# Patient Record
Sex: FEMALE | Race: WHITE | NOT HISPANIC OR LATINO | URBAN - METROPOLITAN AREA
[De-identification: names, ages, dates, MRNs, and addresses within clinical notes are randomized per-mention and may not be internally consistent; named-entity substitution may affect disease eponyms.]

---

## 2018-11-29 ENCOUNTER — OUTPATIENT (OUTPATIENT)
Dept: OUTPATIENT SERVICES | Facility: HOSPITAL | Age: 83
LOS: 1 days | Discharge: HOME | End: 2018-11-29

## 2018-11-29 DIAGNOSIS — E03.9 HYPOTHYROIDISM, UNSPECIFIED: ICD-10-CM

## 2018-11-29 DIAGNOSIS — E11.9 TYPE 2 DIABETES MELLITUS WITHOUT COMPLICATIONS: ICD-10-CM

## 2018-11-29 DIAGNOSIS — I10 ESSENTIAL (PRIMARY) HYPERTENSION: ICD-10-CM

## 2018-11-29 DIAGNOSIS — Z00.00 ENCOUNTER FOR GENERAL ADULT MEDICAL EXAMINATION WITHOUT ABNORMAL FINDINGS: ICD-10-CM

## 2018-11-29 DIAGNOSIS — E78.2 MIXED HYPERLIPIDEMIA: ICD-10-CM

## 2021-01-25 ENCOUNTER — EMERGENCY (EMERGENCY)
Facility: HOSPITAL | Age: 86
LOS: 0 days | Discharge: HOME | End: 2021-01-25
Attending: EMERGENCY MEDICINE | Admitting: EMERGENCY MEDICINE
Payer: MEDICARE

## 2021-01-25 VITALS
SYSTOLIC BLOOD PRESSURE: 179 MMHG | HEART RATE: 90 BPM | DIASTOLIC BLOOD PRESSURE: 83 MMHG | TEMPERATURE: 99 F | RESPIRATION RATE: 18 BRPM | OXYGEN SATURATION: 97 %

## 2021-01-25 VITALS
DIASTOLIC BLOOD PRESSURE: 93 MMHG | WEIGHT: 115.08 LBS | TEMPERATURE: 99 F | RESPIRATION RATE: 18 BRPM | SYSTOLIC BLOOD PRESSURE: 185 MMHG | OXYGEN SATURATION: 98 % | HEART RATE: 73 BPM

## 2021-01-25 DIAGNOSIS — R11.0 NAUSEA: ICD-10-CM

## 2021-01-25 DIAGNOSIS — R10.13 EPIGASTRIC PAIN: ICD-10-CM

## 2021-01-25 DIAGNOSIS — Z20.822 CONTACT WITH AND (SUSPECTED) EXPOSURE TO COVID-19: ICD-10-CM

## 2021-01-25 DIAGNOSIS — R11.2 NAUSEA WITH VOMITING, UNSPECIFIED: ICD-10-CM

## 2021-01-25 DIAGNOSIS — I10 ESSENTIAL (PRIMARY) HYPERTENSION: ICD-10-CM

## 2021-01-25 DIAGNOSIS — R42 DIZZINESS AND GIDDINESS: ICD-10-CM

## 2021-01-25 LAB
ALBUMIN SERPL ELPH-MCNC: 4.9 G/DL — SIGNIFICANT CHANGE UP (ref 3.5–5.2)
ALP SERPL-CCNC: 90 U/L — SIGNIFICANT CHANGE UP (ref 30–115)
ALT FLD-CCNC: 12 U/L — SIGNIFICANT CHANGE UP (ref 0–41)
ANION GAP SERPL CALC-SCNC: 15 MMOL/L — HIGH (ref 7–14)
APPEARANCE UR: CLEAR — SIGNIFICANT CHANGE UP
AST SERPL-CCNC: 20 U/L — SIGNIFICANT CHANGE UP (ref 0–41)
BACTERIA # UR AUTO: NEGATIVE — SIGNIFICANT CHANGE UP
BASOPHILS # BLD AUTO: 0.04 K/UL — SIGNIFICANT CHANGE UP (ref 0–0.2)
BASOPHILS NFR BLD AUTO: 0.4 % — SIGNIFICANT CHANGE UP (ref 0–1)
BILIRUB DIRECT SERPL-MCNC: 0.2 MG/DL — SIGNIFICANT CHANGE UP (ref 0–0.2)
BILIRUB INDIRECT FLD-MCNC: 0.6 MG/DL — SIGNIFICANT CHANGE UP (ref 0.2–1.2)
BILIRUB SERPL-MCNC: 0.8 MG/DL — SIGNIFICANT CHANGE UP (ref 0.2–1.2)
BILIRUB UR-MCNC: NEGATIVE — SIGNIFICANT CHANGE UP
BUN SERPL-MCNC: 19 MG/DL — SIGNIFICANT CHANGE UP (ref 10–20)
CALCIUM SERPL-MCNC: 10 MG/DL — SIGNIFICANT CHANGE UP (ref 8.5–10.1)
CHLORIDE SERPL-SCNC: 104 MMOL/L — SIGNIFICANT CHANGE UP (ref 98–110)
CO2 SERPL-SCNC: 22 MMOL/L — SIGNIFICANT CHANGE UP (ref 17–32)
COLOR SPEC: SIGNIFICANT CHANGE UP
CREAT SERPL-MCNC: 1.1 MG/DL — SIGNIFICANT CHANGE UP (ref 0.7–1.5)
DIFF PNL FLD: ABNORMAL
EOSINOPHIL # BLD AUTO: 0.05 K/UL — SIGNIFICANT CHANGE UP (ref 0–0.7)
EOSINOPHIL NFR BLD AUTO: 0.5 % — SIGNIFICANT CHANGE UP (ref 0–8)
EPI CELLS # UR: 1 /HPF — SIGNIFICANT CHANGE UP (ref 0–5)
GLUCOSE SERPL-MCNC: 143 MG/DL — HIGH (ref 70–99)
GLUCOSE UR QL: NEGATIVE — SIGNIFICANT CHANGE UP
HCT VFR BLD CALC: 41.3 % — SIGNIFICANT CHANGE UP (ref 37–47)
HGB BLD-MCNC: 13.3 G/DL — SIGNIFICANT CHANGE UP (ref 12–16)
HYALINE CASTS # UR AUTO: 1 /LPF — SIGNIFICANT CHANGE UP (ref 0–7)
IMM GRANULOCYTES NFR BLD AUTO: 0.5 % — HIGH (ref 0.1–0.3)
KETONES UR-MCNC: ABNORMAL
LACTATE SERPL-SCNC: 1.7 MMOL/L — SIGNIFICANT CHANGE UP (ref 0.7–2)
LEUKOCYTE ESTERASE UR-ACNC: NEGATIVE — SIGNIFICANT CHANGE UP
LIDOCAIN IGE QN: 33 U/L — SIGNIFICANT CHANGE UP (ref 7–60)
LYMPHOCYTES # BLD AUTO: 2.19 K/UL — SIGNIFICANT CHANGE UP (ref 1.2–3.4)
LYMPHOCYTES # BLD AUTO: 21.7 % — SIGNIFICANT CHANGE UP (ref 20.5–51.1)
MCHC RBC-ENTMCNC: 29.4 PG — SIGNIFICANT CHANGE UP (ref 27–31)
MCHC RBC-ENTMCNC: 32.2 G/DL — SIGNIFICANT CHANGE UP (ref 32–37)
MCV RBC AUTO: 91.4 FL — SIGNIFICANT CHANGE UP (ref 81–99)
MONOCYTES # BLD AUTO: 0.8 K/UL — HIGH (ref 0.1–0.6)
MONOCYTES NFR BLD AUTO: 7.9 % — SIGNIFICANT CHANGE UP (ref 1.7–9.3)
NEUTROPHILS # BLD AUTO: 6.95 K/UL — HIGH (ref 1.4–6.5)
NEUTROPHILS NFR BLD AUTO: 69 % — SIGNIFICANT CHANGE UP (ref 42.2–75.2)
NITRITE UR-MCNC: NEGATIVE — SIGNIFICANT CHANGE UP
NRBC # BLD: 0 /100 WBCS — SIGNIFICANT CHANGE UP (ref 0–0)
PH UR: 6.5 — SIGNIFICANT CHANGE UP (ref 5–8)
PLATELET # BLD AUTO: 236 K/UL — SIGNIFICANT CHANGE UP (ref 130–400)
POTASSIUM SERPL-MCNC: 3.8 MMOL/L — SIGNIFICANT CHANGE UP (ref 3.5–5)
POTASSIUM SERPL-SCNC: 3.8 MMOL/L — SIGNIFICANT CHANGE UP (ref 3.5–5)
PROT SERPL-MCNC: 7.5 G/DL — SIGNIFICANT CHANGE UP (ref 6–8)
PROT UR-MCNC: ABNORMAL
RBC # BLD: 4.52 M/UL — SIGNIFICANT CHANGE UP (ref 4.2–5.4)
RBC # FLD: 13.8 % — SIGNIFICANT CHANGE UP (ref 11.5–14.5)
RBC CASTS # UR COMP ASSIST: 3 /HPF — SIGNIFICANT CHANGE UP (ref 0–4)
SARS-COV-2 RNA SPEC QL NAA+PROBE: SIGNIFICANT CHANGE UP
SODIUM SERPL-SCNC: 141 MMOL/L — SIGNIFICANT CHANGE UP (ref 135–146)
SP GR SPEC: 1.05 — HIGH (ref 1.01–1.03)
TROPONIN T SERPL-MCNC: <0.01 NG/ML — SIGNIFICANT CHANGE UP
UROBILINOGEN FLD QL: SIGNIFICANT CHANGE UP
WBC # BLD: 10.08 K/UL — SIGNIFICANT CHANGE UP (ref 4.8–10.8)
WBC # FLD AUTO: 10.08 K/UL — SIGNIFICANT CHANGE UP (ref 4.8–10.8)
WBC UR QL: 12 /HPF — HIGH (ref 0–5)

## 2021-01-25 PROCEDURE — 70450 CT HEAD/BRAIN W/O DYE: CPT | Mod: 26

## 2021-01-25 PROCEDURE — 93010 ELECTROCARDIOGRAM REPORT: CPT

## 2021-01-25 PROCEDURE — 74177 CT ABD & PELVIS W/CONTRAST: CPT | Mod: 26

## 2021-01-25 PROCEDURE — 99284 EMERGENCY DEPT VISIT MOD MDM: CPT

## 2021-01-25 RX ORDER — MECLIZINE HCL 12.5 MG
50 TABLET ORAL ONCE
Refills: 0 | Status: COMPLETED | OUTPATIENT
Start: 2021-01-25 | End: 2021-01-25

## 2021-01-25 RX ORDER — SODIUM CHLORIDE 9 MG/ML
1000 INJECTION, SOLUTION INTRAVENOUS ONCE
Refills: 0 | Status: COMPLETED | OUTPATIENT
Start: 2021-01-25 | End: 2021-01-25

## 2021-01-25 RX ORDER — FAMOTIDINE 10 MG/ML
20 INJECTION INTRAVENOUS ONCE
Refills: 0 | Status: COMPLETED | OUTPATIENT
Start: 2021-01-25 | End: 2021-01-25

## 2021-01-25 RX ORDER — ONDANSETRON 8 MG/1
4 TABLET, FILM COATED ORAL ONCE
Refills: 0 | Status: COMPLETED | OUTPATIENT
Start: 2021-01-25 | End: 2021-01-25

## 2021-01-25 RX ORDER — MECLIZINE HCL 12.5 MG
1 TABLET ORAL
Qty: 9 | Refills: 0
Start: 2021-01-25 | End: 2021-01-27

## 2021-01-25 RX ADMIN — Medication 50 MILLIGRAM(S): at 14:52

## 2021-01-25 RX ADMIN — FAMOTIDINE 20 MILLIGRAM(S): 10 INJECTION INTRAVENOUS at 14:52

## 2021-01-25 RX ADMIN — SODIUM CHLORIDE 1000 MILLILITER(S): 9 INJECTION, SOLUTION INTRAVENOUS at 14:52

## 2021-01-25 RX ADMIN — ONDANSETRON 4 MILLIGRAM(S): 8 TABLET, FILM COATED ORAL at 14:53

## 2021-01-25 NOTE — ED PROVIDER NOTE - NS ED ROS FT
Constitutional: (-) fever, (-) chills  Eyes: (+) visual changes  ENT: (-) nasal congestions  Cardiovascular: (-) chest pain, (-) syncope  Respiratory: (-) cough, (-) shortness of breath, (-) dyspnea,   Gastrointestinal: (+) vomiting, (-) diarrhea, (+)nausea,  Musculoskeletal: (-) neck pain, (-) back pain, (-) joint pain,  Integumentary: (-) rash, (-) edema, (-) bruises  Neurological: (-) headache, (-) loc, (+) dizziness, (-) tingling, (-)numbness,  Peripheral Vascular: (-) leg swelling  :  (-)dysuria,  (-) hematuria  Allergic/Immunologic: (-) pruritus

## 2021-01-25 NOTE — ED ADULT NURSE REASSESSMENT NOTE - NS ED NURSE REASSESS COMMENT FT1
Pt assessed. IV patent. Ambulated with assistant. Pt resting comfortably in bed. Awaiting for urine result.

## 2021-01-25 NOTE — ED PROVIDER NOTE - PROGRESS NOTE DETAILS
pt ambulating without difficulties, reports dizziness, nausea, and vision issues resolved. s/w daughter, will  pt. sxs that should prompt return explained to pt, verbalizes understanding.

## 2021-01-25 NOTE — ED ADULT NURSE NOTE - NSIMPLEMENTINTERV_GEN_ALL_ED
Implemented All Fall with Harm Risk Interventions:  [place] to call system. Call bell, personal items and telephone within reach. Instruct patient to call for assistance. Room bathroom lighting operational. Non-slip footwear when patient is off stretcher. Physically safe environment: no spills, clutter or unnecessary equipment. Stretcher in lowest position, wheels locked, appropriate side rails in place. Provide visual cue, wrist band, yellow gown, etc. Monitor gait and stability. Monitor for mental status changes and reorient to person, place, and time. Review medications for side effects contributing to fall risk. Reinforce activity limits and safety measures with patient and family. Provide visual clues: red socks.

## 2021-01-25 NOTE — ED ADULT NURSE NOTE - CAS ELECT INFOMATION PROVIDED
DC instructions d/c instruction discussed with daughter Khalida and verbalized understanding/DC instructions

## 2021-01-25 NOTE — ED ADULT NURSE NOTE - OBJECTIVE STATEMENT
Pt BIBA from PMD for  nausea, vomiting, dizziness and blurry vision x 2 weeks.. Denies any other symptoms.

## 2021-01-25 NOTE — ED PROVIDER NOTE - ATTENDING CONTRIBUTION TO CARE
87F PMH HTN hypothyroid, p/w 1 week of lightheadedness, tired, epigastric burning, nbnb emesis. no cp, sob. no fever, cough. no d/c. occasional dysuria and freq. no hematuria. no ha, numbness, weakness, tingling. +blurry vision. no loc or trauma. pt seen at  and sent to ED, given dose of zofran. lives w son.  hard of hearing.     on exam, AFVSS, well hasmukh nad, ncat, eomi, perrla, mmm, lctab, rrr nl s1s2 no mrg, abd soft +mild suprapubic ttp, no rebound or rigidity, no cvat, nd, aaox3, CN 2-12 intact, No nystagmus.  5/5 motor x 4 ext, SILT x 4 extremities, No facial droop or slurred speech. No pronator drift.  Normal rapid alternating movement bilaterally. No midline C/T/L tenderness to palpation or step off. no le edema or calf ttp,     a/p; dizzy, nausea, abd pain, will do labs, ekg/trop, CXR, UA, CTH, CT a/p, ivf zofran meclizine re-eval

## 2021-01-25 NOTE — ED ADULT TRIAGE NOTE - CHIEF COMPLAINT QUOTE
BIBA from doctor's office, sent in for hypertension, did not take blood pressure medications, was originally being seen for nausea, denies abdominal pain, given zofran IM by MD.

## 2021-01-25 NOTE — ED PROVIDER NOTE - CARE PROVIDER_API CALL
Tate Ewing)  EEGEpilepsy; Neurology  [address] [place] [address], [address]  [place] [address]  Phone: [phone]  Fax: [phone]  Follow Up Time: 1-3 Days

## 2021-01-25 NOTE — ED PROVIDER NOTE - PATIENT PORTAL LINK FT
You can access the FollowMyHealth Patient Portal offered by [facility] by registering at the following website: [email or web address]. By joining [employer]’s FollowMyHealth portal, you will also be able to view your health information using other applications (apps) compatible with our system.

## 2021-01-25 NOTE — ED PROVIDER NOTE - PHYSICAL EXAMINATION
Physical Exam    Vital Signs: I have reviewed the initial vital signs.  Constitutional: well-nourished, appears stated age, no acute distress  Eyes: Conjunctiva pink, Sclera clear, PERRLA, EOMI, no nystagmus  Cardiovascular: S1 and S2, regular rate, regular rhythm, well-perfused extremities, radial pulses equal and 2+  Respiratory: unlabored respiratory effort, clear to auscultation bilaterally no wheezing, rales and rhonchi  Gastrointestinal: soft, mild lower abd ttp, no guarding or rebound, no pulsatile mass, normal bowl sounds  Musculoskeletal: supple neck, no lower extremity edema, no midline tenderness  Integumentary: warm, dry, no rash  Neurologic: awake, alert, cranial nerves II-XII grossly intact, extremities’ motor and sensory functions grossly intact, no pronator drift, normal speech, no focal deficits.

## 2021-01-25 NOTE — ED PROVIDER NOTE - OBJECTIVE STATEMENT
86 yo female, pmh of htn and hypothyroidism, presents to ed for nausea, dizziness, blurry vision x 1.5 weeks, [place] [facility] [place] sent in for further evaluation. Pt states mild lower abd pain as fell, mild, aching, no radiation. States has vertigo before in past and similar to this. Denies fever, chills, cp, sob, le swelling, diarrhea, numbness, tingling.

## 2021-01-25 NOTE — ED PROVIDER NOTE - CLINICAL SUMMARY MEDICAL DECISION MAKING FREE TEXT BOX
pt symptoms resolved, ambulating in ED w steady gait. andres po. work up unremarkable. discussed w daughter who will come  pt. dc home, supportive care, f/u pmd 1-2 weeks, strict return precautions provided.

## 2021-04-04 ENCOUNTER — EMERGENCY (EMERGENCY)
Facility: HOSPITAL | Age: 86
LOS: 0 days | Discharge: HOME | End: 2021-04-04
Attending: EMERGENCY MEDICINE | Admitting: EMERGENCY MEDICINE
Payer: MEDICARE

## 2021-04-04 VITALS
OXYGEN SATURATION: 99 % | SYSTOLIC BLOOD PRESSURE: 175 MMHG | RESPIRATION RATE: 18 BRPM | HEART RATE: 105 BPM | DIASTOLIC BLOOD PRESSURE: 81 MMHG | TEMPERATURE: 96 F

## 2021-04-04 DIAGNOSIS — R10.13 EPIGASTRIC PAIN: ICD-10-CM

## 2021-04-04 DIAGNOSIS — R42 DIZZINESS AND GIDDINESS: ICD-10-CM

## 2021-04-04 PROBLEM — I10 ESSENTIAL (PRIMARY) HYPERTENSION: Chronic | Status: ACTIVE | Noted: 2021-01-25

## 2021-04-04 PROBLEM — E03.9 HYPOTHYROIDISM, UNSPECIFIED: Chronic | Status: ACTIVE | Noted: 2021-01-25

## 2021-04-04 LAB
ALBUMIN SERPL ELPH-MCNC: 4.9 G/DL — SIGNIFICANT CHANGE UP (ref 3.5–5.2)
ALP SERPL-CCNC: 84 U/L — SIGNIFICANT CHANGE UP (ref 30–115)
ALT FLD-CCNC: 11 U/L — SIGNIFICANT CHANGE UP (ref 0–41)
ANION GAP SERPL CALC-SCNC: 16 MMOL/L — HIGH (ref 7–14)
APTT BLD: 29.5 SEC — SIGNIFICANT CHANGE UP (ref 27–39.2)
AST SERPL-CCNC: 20 U/L — SIGNIFICANT CHANGE UP (ref 0–41)
BASOPHILS # BLD AUTO: 0.07 K/UL — SIGNIFICANT CHANGE UP (ref 0–0.2)
BASOPHILS NFR BLD AUTO: 0.6 % — SIGNIFICANT CHANGE UP (ref 0–1)
BILIRUB SERPL-MCNC: 0.3 MG/DL — SIGNIFICANT CHANGE UP (ref 0.2–1.2)
BUN SERPL-MCNC: 17 MG/DL — SIGNIFICANT CHANGE UP (ref 10–20)
CALCIUM SERPL-MCNC: 10.3 MG/DL — HIGH (ref 8.5–10.1)
CHLORIDE SERPL-SCNC: 105 MMOL/L — SIGNIFICANT CHANGE UP (ref 98–110)
CO2 SERPL-SCNC: 21 MMOL/L — SIGNIFICANT CHANGE UP (ref 17–32)
CREAT SERPL-MCNC: 1.2 MG/DL — SIGNIFICANT CHANGE UP (ref 0.7–1.5)
EOSINOPHIL # BLD AUTO: 0.18 K/UL — SIGNIFICANT CHANGE UP (ref 0–0.7)
EOSINOPHIL NFR BLD AUTO: 1.5 % — SIGNIFICANT CHANGE UP (ref 0–8)
GLUCOSE SERPL-MCNC: 208 MG/DL — HIGH (ref 70–99)
HCT VFR BLD CALC: 41.4 % — SIGNIFICANT CHANGE UP (ref 37–47)
HGB BLD-MCNC: 13.5 G/DL — SIGNIFICANT CHANGE UP (ref 12–16)
IMM GRANULOCYTES NFR BLD AUTO: 0.2 % — SIGNIFICANT CHANGE UP (ref 0.1–0.3)
INR BLD: 0.97 RATIO — SIGNIFICANT CHANGE UP (ref 0.65–1.3)
LIDOCAIN IGE QN: 70 U/L — HIGH (ref 7–60)
LYMPHOCYTES # BLD AUTO: 24.6 % — SIGNIFICANT CHANGE UP (ref 20.5–51.1)
LYMPHOCYTES # BLD AUTO: 3.02 K/UL — SIGNIFICANT CHANGE UP (ref 1.2–3.4)
MCHC RBC-ENTMCNC: 29.9 PG — SIGNIFICANT CHANGE UP (ref 27–31)
MCHC RBC-ENTMCNC: 32.6 G/DL — SIGNIFICANT CHANGE UP (ref 32–37)
MCV RBC AUTO: 91.8 FL — SIGNIFICANT CHANGE UP (ref 81–99)
MONOCYTES # BLD AUTO: 0.85 K/UL — HIGH (ref 0.1–0.6)
MONOCYTES NFR BLD AUTO: 6.9 % — SIGNIFICANT CHANGE UP (ref 1.7–9.3)
NEUTROPHILS # BLD AUTO: 8.12 K/UL — HIGH (ref 1.4–6.5)
NEUTROPHILS NFR BLD AUTO: 66.2 % — SIGNIFICANT CHANGE UP (ref 42.2–75.2)
NRBC # BLD: 0 /100 WBCS — SIGNIFICANT CHANGE UP (ref 0–0)
PLATELET # BLD AUTO: 213 K/UL — SIGNIFICANT CHANGE UP (ref 130–400)
POTASSIUM SERPL-MCNC: 4.4 MMOL/L — SIGNIFICANT CHANGE UP (ref 3.5–5)
POTASSIUM SERPL-SCNC: 4.4 MMOL/L — SIGNIFICANT CHANGE UP (ref 3.5–5)
PROT SERPL-MCNC: 7.8 G/DL — SIGNIFICANT CHANGE UP (ref 6–8)
PROTHROM AB SERPL-ACNC: 11.2 SEC — SIGNIFICANT CHANGE UP (ref 9.95–12.87)
RBC # BLD: 4.51 M/UL — SIGNIFICANT CHANGE UP (ref 4.2–5.4)
RBC # FLD: 13.7 % — SIGNIFICANT CHANGE UP (ref 11.5–14.5)
SODIUM SERPL-SCNC: 142 MMOL/L — SIGNIFICANT CHANGE UP (ref 135–146)
WBC # BLD: 12.27 K/UL — HIGH (ref 4.8–10.8)
WBC # FLD AUTO: 12.27 K/UL — HIGH (ref 4.8–10.8)

## 2021-04-04 PROCEDURE — 99284 EMERGENCY DEPT VISIT MOD MDM: CPT | Mod: GC

## 2021-04-04 PROCEDURE — 70450 CT HEAD/BRAIN W/O DYE: CPT | Mod: 26,MH

## 2021-04-04 PROCEDURE — 74177 CT ABD & PELVIS W/CONTRAST: CPT | Mod: 26,MH

## 2021-04-04 PROCEDURE — 93010 ELECTROCARDIOGRAM REPORT: CPT

## 2021-04-04 RX ORDER — FAMOTIDINE 10 MG/ML
20 INJECTION INTRAVENOUS ONCE
Refills: 0 | Status: COMPLETED | OUTPATIENT
Start: 2021-04-04 | End: 2021-04-04

## 2021-04-04 RX ORDER — MECLIZINE HCL 12.5 MG
50 TABLET ORAL ONCE
Refills: 0 | Status: COMPLETED | OUTPATIENT
Start: 2021-04-04 | End: 2021-04-04

## 2021-04-04 RX ORDER — METOCLOPRAMIDE HCL 10 MG
10 TABLET ORAL ONCE
Refills: 0 | Status: COMPLETED | OUTPATIENT
Start: 2021-04-04 | End: 2021-04-04

## 2021-04-04 RX ORDER — SODIUM CHLORIDE 9 MG/ML
1500 INJECTION, SOLUTION INTRAVENOUS ONCE
Refills: 0 | Status: COMPLETED | OUTPATIENT
Start: 2021-04-04 | End: 2021-04-04

## 2021-04-04 RX ORDER — MECLIZINE HCL 12.5 MG
25 TABLET ORAL ONCE
Refills: 0 | Status: DISCONTINUED | OUTPATIENT
Start: 2021-04-04 | End: 2021-04-04

## 2021-04-04 RX ADMIN — Medication 50 MILLIGRAM(S): at 02:02

## 2021-04-04 RX ADMIN — FAMOTIDINE 104 MILLIGRAM(S): 10 INJECTION INTRAVENOUS at 01:14

## 2021-04-04 RX ADMIN — Medication 10 MILLIGRAM(S): at 02:47

## 2021-04-04 RX ADMIN — SODIUM CHLORIDE 1500 MILLILITER(S): 9 INJECTION, SOLUTION INTRAVENOUS at 01:14

## 2021-04-04 NOTE — ED ADULT TRIAGE NOTE - CHIEF COMPLAINT QUOTE
Pt c/o feeling dizzy, nauseous, epigastric and abd pain X 2 days. Denies diarrhea, fever. Has history of vertigo.

## 2021-04-04 NOTE — ED PROVIDER NOTE - NS ED ROS FT
Constitutional: See HPI.  Eyes: No visual changes, eye pain or discharge. No Photophobia  ENMT: No hearing changes, pain, discharge or infections. No neck pain or stiffness. No limited ROM  Cardiac: No SOB or edema. No chest pain with exertion.  Respiratory: No cough or respiratory distress.  GI: see hpi   : No dysuria, frequency or burning. No Discharge  MS: No myalgia, muscle weakness, joint pain or back pain.  Neuro: see hpi   Skin: No skin rash.  Except as documented in the HPI, all other systems are negative.

## 2021-04-04 NOTE — ED PROVIDER NOTE - ATTENDING CONTRIBUTION TO CARE
I personally evaluated the patient. I reviewed the Resident’s or Physician Assistant’s note (as assigned above), and agree with the findings and plan except as documented in my note.  87 yo woman with two complaints.  Patient has vertigo.  She feels the room is spinning.  It is worse with movement of her head.  She feels very unsteady.  In addition, she has epigastric pain with recurrent NB/NB emesis.  Bowel movements ok.  No GI bleeding.  Workup in the ED was ok, and after treatment with meclizine and IVF, patient much improved.  CT scans all ok including head/abd/pelvis. On reassessment, patient felt like she was well enough to go home.  Will discharge with plan for outpatient follow up.

## 2021-04-04 NOTE — ED PROVIDER NOTE - NS ED MD DISPO DISCHARGE CCDA
C/o [id number], body aches. Advised tylenol, rest light diet. To ER for temp over 101.   Patient/Caregiver provided printed discharge information.

## 2021-04-04 NOTE — ED PROVIDER NOTE - CLINICAL SUMMARY MEDICAL DECISION MAKING FREE TEXT BOX
89 yo woman with vertigo and abd pain.  thorough workup in the ED was normal.  Patient improved with some medication and IVF in ED.  On reassessment, patient would like to go home.  She was discharged with plan for outpatient follow up or return for any new or worsening,.

## 2021-04-04 NOTE — ED PROVIDER NOTE - CARE PLAN
Principal Discharge DX:	Abdominal pain   Principal Discharge DX:	Abdominal pain  Secondary Diagnosis:	Vertigo

## 2021-04-04 NOTE — ED PROVIDER NOTE - PATIENT PORTAL LINK FT
You can access the FollowMyHealth Patient Portal offered by [facility] by registering at the following website: [email or web address]. By joining [employer]’s FollowMyHealth portal, you will also be able to view your health information using other applications (apps) compatible with our system.

## 2021-04-04 NOTE — ED PROVIDER NOTE - OBJECTIVE STATEMENT
88F HTN, Vertigo and hypothyroidism, presents to ed for nausea, dizziness Pt also endores epigastric  abd pain mild, aching, no radiation associated with nbnb emesis. States has vertigo before in past and similar to this. Denies fever, chills, cp, sob, le swelling, diarrhea, numbness, tingling.

## 2021-04-04 NOTE — ED PROVIDER NOTE - PHYSICAL EXAMINATION
VITAL SIGNS: I have reviewed nursing notes and confirm.  CONSTITUTIONAL: well-appearing, non-toxic  SKIN: Warm dry, normal skin turgor  HEAD: NCAT  EYES: EOMI, PERRLA, no scleral icterus  ENT: Moist mucous membranes, normal pharynx with no erythema or exudates  NECK: Supple; non tender. Full ROM. No cervical LAD  CARD: RRR, no murmurs, rubs or gallops  RESP: clear to ausculation b/l.  No rales, rhonchi, or wheezing.  ABD: soft, + BS, epigastric tenderness, non-distended, no rebound or guarding. No CVA tenderness  EXT: Full ROM, no bony tenderness, no pedal edema, no calf tenderness  NEURO: normal motor. normal sensory. CN II-XII intact. Cerebellar testing normal. Normal gait.  PSYCH: Cooperative, appropriate.

## 2021-04-04 NOTE — ED PROVIDER NOTE - PROGRESS NOTE DETAILS
patients symptoms alleviated completely. denies any other complaints. will f/u with PCP. return precautions discussed.

## 2021-04-09 ENCOUNTER — INPATIENT (INPATIENT)
Facility: HOSPITAL | Age: 86
LOS: 4 days | Discharge: NURSING, REHAB OR LONG-TERM CARE | End: 2021-04-14
Attending: INTERNAL MEDICINE | Admitting: INTERNAL MEDICINE
Payer: MEDICARE

## 2021-04-09 VITALS
SYSTOLIC BLOOD PRESSURE: 164 MMHG | DIASTOLIC BLOOD PRESSURE: 74 MMHG | HEART RATE: 87 BPM | TEMPERATURE: 98 F | RESPIRATION RATE: 16 BRPM | OXYGEN SATURATION: 99 %

## 2021-04-09 LAB
ALBUMIN SERPL ELPH-MCNC: 4.2 G/DL — SIGNIFICANT CHANGE UP (ref 3.5–5.2)
ALP SERPL-CCNC: 69 U/L — SIGNIFICANT CHANGE UP (ref 30–115)
ALT FLD-CCNC: 10 U/L — SIGNIFICANT CHANGE UP (ref 0–41)
ANION GAP SERPL CALC-SCNC: 11 MMOL/L — SIGNIFICANT CHANGE UP (ref 7–14)
APPEARANCE UR: CLEAR — SIGNIFICANT CHANGE UP
AST SERPL-CCNC: 23 U/L — SIGNIFICANT CHANGE UP (ref 0–41)
BACTERIA # UR AUTO: ABNORMAL
BASOPHILS # BLD AUTO: 0.03 K/UL — SIGNIFICANT CHANGE UP (ref 0–0.2)
BASOPHILS NFR BLD AUTO: 0.4 % — SIGNIFICANT CHANGE UP (ref 0–1)
BILIRUB SERPL-MCNC: 0.6 MG/DL — SIGNIFICANT CHANGE UP (ref 0.2–1.2)
BILIRUB UR-MCNC: NEGATIVE — SIGNIFICANT CHANGE UP
BUN SERPL-MCNC: 13 MG/DL — SIGNIFICANT CHANGE UP (ref 10–20)
CALCIUM SERPL-MCNC: 9.3 MG/DL — SIGNIFICANT CHANGE UP (ref 8.5–10.1)
CHLORIDE SERPL-SCNC: 105 MMOL/L — SIGNIFICANT CHANGE UP (ref 98–110)
CO2 SERPL-SCNC: 22 MMOL/L — SIGNIFICANT CHANGE UP (ref 17–32)
COLOR SPEC: SIGNIFICANT CHANGE UP
CREAT SERPL-MCNC: 1 MG/DL — SIGNIFICANT CHANGE UP (ref 0.7–1.5)
DIFF PNL FLD: NEGATIVE — SIGNIFICANT CHANGE UP
EOSINOPHIL # BLD AUTO: 0.08 K/UL — SIGNIFICANT CHANGE UP (ref 0–0.7)
EOSINOPHIL NFR BLD AUTO: 1.2 % — SIGNIFICANT CHANGE UP (ref 0–8)
EPI CELLS # UR: 3 /HPF — SIGNIFICANT CHANGE UP (ref 0–5)
GLUCOSE SERPL-MCNC: 123 MG/DL — HIGH (ref 70–99)
GLUCOSE UR QL: NEGATIVE — SIGNIFICANT CHANGE UP
HCT VFR BLD CALC: 36.1 % — LOW (ref 37–47)
HGB BLD-MCNC: 11.7 G/DL — LOW (ref 12–16)
HYALINE CASTS # UR AUTO: 1 /LPF — SIGNIFICANT CHANGE UP (ref 0–7)
IMM GRANULOCYTES NFR BLD AUTO: 0.1 % — SIGNIFICANT CHANGE UP (ref 0.1–0.3)
KETONES UR-MCNC: NEGATIVE — SIGNIFICANT CHANGE UP
LACTATE SERPL-SCNC: 1.1 MMOL/L — SIGNIFICANT CHANGE UP (ref 0.7–2)
LEUKOCYTE ESTERASE UR-ACNC: ABNORMAL
LIDOCAIN IGE QN: 50 U/L — SIGNIFICANT CHANGE UP (ref 7–60)
LYMPHOCYTES # BLD AUTO: 1.75 K/UL — SIGNIFICANT CHANGE UP (ref 1.2–3.4)
LYMPHOCYTES # BLD AUTO: 25.3 % — SIGNIFICANT CHANGE UP (ref 20.5–51.1)
MAGNESIUM SERPL-MCNC: 2.1 MG/DL — SIGNIFICANT CHANGE UP (ref 1.8–2.4)
MCHC RBC-ENTMCNC: 29.8 PG — SIGNIFICANT CHANGE UP (ref 27–31)
MCHC RBC-ENTMCNC: 32.4 G/DL — SIGNIFICANT CHANGE UP (ref 32–37)
MCV RBC AUTO: 91.9 FL — SIGNIFICANT CHANGE UP (ref 81–99)
MONOCYTES # BLD AUTO: 0.6 K/UL — SIGNIFICANT CHANGE UP (ref 0.1–0.6)
MONOCYTES NFR BLD AUTO: 8.7 % — SIGNIFICANT CHANGE UP (ref 1.7–9.3)
NEUTROPHILS # BLD AUTO: 4.46 K/UL — SIGNIFICANT CHANGE UP (ref 1.4–6.5)
NEUTROPHILS NFR BLD AUTO: 64.3 % — SIGNIFICANT CHANGE UP (ref 42.2–75.2)
NITRITE UR-MCNC: POSITIVE
NRBC # BLD: 0 /100 WBCS — SIGNIFICANT CHANGE UP (ref 0–0)
PH UR: 7 — SIGNIFICANT CHANGE UP (ref 5–8)
PLATELET # BLD AUTO: 120 K/UL — LOW (ref 130–400)
POTASSIUM SERPL-MCNC: 4.4 MMOL/L — SIGNIFICANT CHANGE UP (ref 3.5–5)
POTASSIUM SERPL-SCNC: 4.4 MMOL/L — SIGNIFICANT CHANGE UP (ref 3.5–5)
PROT SERPL-MCNC: 6.9 G/DL — SIGNIFICANT CHANGE UP (ref 6–8)
PROT UR-MCNC: ABNORMAL
RBC # BLD: 3.93 M/UL — LOW (ref 4.2–5.4)
RBC # FLD: 14.1 % — SIGNIFICANT CHANGE UP (ref 11.5–14.5)
RBC CASTS # UR COMP ASSIST: 1 /HPF — SIGNIFICANT CHANGE UP (ref 0–4)
SARS-COV-2 RNA SPEC QL NAA+PROBE: SIGNIFICANT CHANGE UP
SODIUM SERPL-SCNC: 138 MMOL/L — SIGNIFICANT CHANGE UP (ref 135–146)
SP GR SPEC: 1.02 — SIGNIFICANT CHANGE UP (ref 1.01–1.03)
TROPONIN T SERPL-MCNC: <0.01 NG/ML — SIGNIFICANT CHANGE UP
UROBILINOGEN FLD QL: SIGNIFICANT CHANGE UP
WBC # BLD: 6.93 K/UL — SIGNIFICANT CHANGE UP (ref 4.8–10.8)
WBC # FLD AUTO: 6.93 K/UL — SIGNIFICANT CHANGE UP (ref 4.8–10.8)
WBC UR QL: 14 /HPF — HIGH (ref 0–5)

## 2021-04-09 PROCEDURE — 93010 ELECTROCARDIOGRAM REPORT: CPT | Mod: 77

## 2021-04-09 PROCEDURE — 99285 EMERGENCY DEPT VISIT HI MDM: CPT | Mod: CS

## 2021-04-09 PROCEDURE — 99223 1ST HOSP IP/OBS HIGH 75: CPT | Mod: AI

## 2021-04-09 PROCEDURE — 99283 EMERGENCY DEPT VISIT LOW MDM: CPT

## 2021-04-09 PROCEDURE — 70450 CT HEAD/BRAIN W/O DYE: CPT | Mod: 26,MH

## 2021-04-09 PROCEDURE — 93010 ELECTROCARDIOGRAM REPORT: CPT

## 2021-04-09 PROCEDURE — 74177 CT ABD & PELVIS W/CONTRAST: CPT | Mod: 26,MH

## 2021-04-09 RX ORDER — ONDANSETRON 8 MG/1
4 TABLET, FILM COATED ORAL ONCE
Refills: 0 | Status: COMPLETED | OUTPATIENT
Start: 2021-04-09 | End: 2021-04-09

## 2021-04-09 RX ORDER — SENNA PLUS 8.6 MG/1
2 TABLET ORAL AT BEDTIME
Refills: 0 | Status: DISCONTINUED | OUTPATIENT
Start: 2021-04-09 | End: 2021-04-14

## 2021-04-09 RX ORDER — MECLIZINE HCL 12.5 MG
25 TABLET ORAL ONCE
Refills: 0 | Status: COMPLETED | OUTPATIENT
Start: 2021-04-09 | End: 2021-04-09

## 2021-04-09 RX ORDER — ONDANSETRON 8 MG/1
4 TABLET, FILM COATED ORAL EVERY 8 HOURS
Refills: 0 | Status: DISCONTINUED | OUTPATIENT
Start: 2021-04-09 | End: 2021-04-14

## 2021-04-09 RX ORDER — SODIUM CHLORIDE 9 MG/ML
1000 INJECTION, SOLUTION INTRAVENOUS
Refills: 0 | Status: DISCONTINUED | OUTPATIENT
Start: 2021-04-09 | End: 2021-04-14

## 2021-04-09 RX ORDER — ENOXAPARIN SODIUM 100 MG/ML
40 INJECTION SUBCUTANEOUS AT BEDTIME
Refills: 0 | Status: DISCONTINUED | OUTPATIENT
Start: 2021-04-09 | End: 2021-04-14

## 2021-04-09 RX ORDER — MECLIZINE HCL 12.5 MG
50 TABLET ORAL ONCE
Refills: 0 | Status: DISCONTINUED | OUTPATIENT
Start: 2021-04-09 | End: 2021-04-09

## 2021-04-09 RX ORDER — CEFTRIAXONE 500 MG/1
1000 INJECTION, POWDER, FOR SOLUTION INTRAMUSCULAR; INTRAVENOUS ONCE
Refills: 0 | Status: COMPLETED | OUTPATIENT
Start: 2021-04-09 | End: 2021-04-09

## 2021-04-09 RX ORDER — SODIUM CHLORIDE 9 MG/ML
1000 INJECTION INTRAMUSCULAR; INTRAVENOUS; SUBCUTANEOUS ONCE
Refills: 0 | Status: COMPLETED | OUTPATIENT
Start: 2021-04-09 | End: 2021-04-09

## 2021-04-09 RX ORDER — METRONIDAZOLE 500 MG
500 TABLET ORAL EVERY 8 HOURS
Refills: 0 | Status: DISCONTINUED | OUTPATIENT
Start: 2021-04-09 | End: 2021-04-14

## 2021-04-09 RX ORDER — CEFTRIAXONE 500 MG/1
1000 INJECTION, POWDER, FOR SOLUTION INTRAMUSCULAR; INTRAVENOUS EVERY 24 HOURS
Refills: 0 | Status: DISCONTINUED | OUTPATIENT
Start: 2021-04-10 | End: 2021-04-14

## 2021-04-09 RX ORDER — LACTULOSE 10 G/15ML
10 SOLUTION ORAL ONCE
Refills: 0 | Status: COMPLETED | OUTPATIENT
Start: 2021-04-09 | End: 2021-04-09

## 2021-04-09 RX ORDER — POLYETHYLENE GLYCOL 3350 17 G/17G
17 POWDER, FOR SOLUTION ORAL DAILY
Refills: 0 | Status: DISCONTINUED | OUTPATIENT
Start: 2021-04-09 | End: 2021-04-14

## 2021-04-09 RX ORDER — METRONIDAZOLE 500 MG
500 TABLET ORAL ONCE
Refills: 0 | Status: COMPLETED | OUTPATIENT
Start: 2021-04-09 | End: 2021-04-09

## 2021-04-09 RX ADMIN — Medication 25 MILLIGRAM(S): at 10:58

## 2021-04-09 RX ADMIN — ONDANSETRON 4 MILLIGRAM(S): 8 TABLET, FILM COATED ORAL at 10:57

## 2021-04-09 RX ADMIN — SODIUM CHLORIDE 2000 MILLILITER(S): 9 INJECTION INTRAMUSCULAR; INTRAVENOUS; SUBCUTANEOUS at 10:57

## 2021-04-09 RX ADMIN — Medication 100 MILLIGRAM(S): at 16:44

## 2021-04-09 RX ADMIN — ENOXAPARIN SODIUM 40 MILLIGRAM(S): 100 INJECTION SUBCUTANEOUS at 21:51

## 2021-04-09 RX ADMIN — Medication 25 MILLIGRAM(S): at 13:57

## 2021-04-09 RX ADMIN — LACTULOSE 10 GRAM(S): 10 SOLUTION ORAL at 18:21

## 2021-04-09 RX ADMIN — ONDANSETRON 4 MILLIGRAM(S): 8 TABLET, FILM COATED ORAL at 13:57

## 2021-04-09 RX ADMIN — SODIUM CHLORIDE 50 MILLILITER(S): 9 INJECTION, SOLUTION INTRAVENOUS at 21:16

## 2021-04-09 RX ADMIN — ONDANSETRON 4 MILLIGRAM(S): 8 TABLET, FILM COATED ORAL at 18:25

## 2021-04-09 RX ADMIN — Medication 100 MILLIGRAM(S): at 21:52

## 2021-04-09 RX ADMIN — CEFTRIAXONE 100 MILLIGRAM(S): 500 INJECTION, POWDER, FOR SOLUTION INTRAMUSCULAR; INTRAVENOUS at 14:57

## 2021-04-09 RX ADMIN — POLYETHYLENE GLYCOL 3350 17 GRAM(S): 17 POWDER, FOR SOLUTION ORAL at 18:21

## 2021-04-09 RX ADMIN — SENNA PLUS 2 TABLET(S): 8.6 TABLET ORAL at 21:51

## 2021-04-09 RX ADMIN — SODIUM CHLORIDE 1000 MILLILITER(S): 9 INJECTION INTRAMUSCULAR; INTRAVENOUS; SUBCUTANEOUS at 14:57

## 2021-04-09 NOTE — ED ADULT TRIAGE NOTE - CHIEF COMPLAINT QUOTE
as per son, "I brought her the other night she was impacted and shes been good the past couple of days and now she's having the same symptoms" Pt c/o abdominal pain and nausea

## 2021-04-09 NOTE — H&P ADULT - HISTORY OF PRESENT ILLNESS
88 yr F with HTN and Vertigo and hypothyroidism presented jag ER due to abd pain, nausea and vomiting  the pt had difficulty hearing, i called the son and the daughter but no one responded to the calls, pt able to give some Hx,     the pt has been having abd pain since one week, pain is epigastric and LLQ, colicky 7/10, worse with food intake, associated with nausea and 3x vomiting yesterday,  pt had CT abd on 4/4 and showed large stool burden and lower esophageal thickening, pt was sent home with conservative treatment, and has been having daily BM.   pt cont to have pain and also now with vomiting and nausea, no fever, chest pain, seizure, syncope, urinary symptoms, melena, bloody vomiting, bloody BM.    in ER: VS wnl, labs no leukocytosis, all wnl, but CT abd shows intramural thickening of right colon and rectosigmoidal area, pt got Rocephin and Flagyl and admitted to medicine

## 2021-04-09 NOTE — H&P ADULT - NSHPLABSRESULTS_GEN_ALL_CORE
LABS:                        11.7   6.93  )-----------( 120      ( 09 Apr 2021 11:29 )             36.1     09 Apr 2021 11:29    138    |  105    |  13     ----------------------------<  123    4.4     |  22     |  1.0      Ca    9.3        09 Apr 2021 11:29  Mg     2.1       09 Apr 2021 11:29    TPro  6.9    /  Alb  4.2    /  TBili  0.6    /  DBili  x      /  AST  23     /  ALT  10     /  AlkPhos  69     09 Apr 2021 11:29    < from: CT Abdomen and Pelvis w/ IV Cont (04.09.21 @ 12:58) >    Apparent mural thickening involving the right colon and rectosigmoid. No significant surrounding inflammatory changes noted. No evidence of bowel obstruction ascites or free air. Likely retained oral contrast in the colon.    < end of copied text >

## 2021-04-09 NOTE — H&P ADULT - ATTENDING COMMENTS
Ms Torres is an 88 yr woman with medical history of HTN, Vertigo, and hypothyroidism who presented to ER due to abd pain, nausea and vomiting      #abd pain and vomiting likley related to infectious VS inflammatory colitis, malignancy and IBD should be considered after resolution of pain as there is different sites of thickening on CT scan  Right colon, rectosigmoidal and lower oesophageal (on CT 4/4),   sepsis not present  started Rocephin and flagyl   f/u lactate  start clear liquid and IVF  start bowel regimen, Miralax, senna and one dose lactulose     #pt doesn't know her pharmacy, called her demario nd daughter with no reponse, plz verify med req in AM if possible     #HTN    #Vertigo  c/w meclizine PRN    #hypothyroidism  not on meds  f/u TSH     DVT pro: Lovenox  Dispo: from home Ms Torres is an 88 yr woman with medical history of HTN, Vertigo, and hypothyroidism who presented to ER due to abd pain, nausea and vomiting      #abd pain and vomiting likley related to infectious VS inflammatory colitis, malignancy and IBD should be considered after resolution of pain as there is different sites of thickening on CT scan  Right colon, rectosigmoidal and lower oesophageal (on CT 4/4),   sepsis not present  started Rocephin and flagyl   f/u lactate  start clear liquid and IVF  start bowel regimen, Miralax, senna and one dose lactulose     #UTI   pt with positive UA and increased urinary frequency   c/w Rocephin   f/u UCx    #pt doesn't know her pharmacy, called her demario nd daughter with no reponse, plz verify med req in AM if possible     #HTN    #Vertigo  c/w meclizine PRN    #hypothyroidism  not on meds  f/u TSH     DVT pro: Lovenox  Dispo: from home

## 2021-04-09 NOTE — CONSULT NOTE ADULT - SUBJECTIVE AND OBJECTIVE BOX
Neurology Consult    Patient is a 88y old  Female who presents with a chief complaint of colitis (2021 17:39)      HPI:  88 yr F with HTN and Vertigo and hypothyroidism presented jag ER due to abd pain, nausea and vomiting  the pt had difficulty hearing, i called the son and the daughter but no one responded to the calls, pt able to give some Hx,     the pt has been having abd pain since one week, pain is epigastric and LLQ, colicky 7/10, worse with food intake, associated with nausea and 3x vomiting yesterday,  pt had CT abd on  and showed large stool burden and lower esophageal thickening, pt was sent home with conservative treatment, and has been having daily BM.   pt cont to have pain and also now with vomiting and nausea, no fever, chest pain, seizure, syncope, urinary symptoms, melena, bloody vomiting, bloody BM.    in ER: VS wnl, labs no leukocytosis, all wnl, but CT abd shows intramural thickening of right colon and rectosigmoidal area, pt got Rocephin and Flagyl and admitted to medicine  (2021 17:39)    *** Neurology ***  Patient has history of headaches but has been feeling ill as described above.  Increase in dizziness recently.  CTH unremarkable except for microvascular changes.    PAST MEDICAL & SURGICAL HISTORY:  HTN (hypertension)    Hypothyroid      FAMILY HISTORY:      Social History: (-) x 3    Allergies  No Known Allergies    Intolerances        MEDICATIONS  (STANDING):  enoxaparin Injectable 40 milliGRAM(s) SubCutaneous at bedtime  lactated ringers. 1000 milliLiter(s) (50 mL/Hr) IV Continuous <Continuous>  metroNIDAZOLE  IVPB 500 milliGRAM(s) IV Intermittent every 8 hours  polyethylene glycol 3350 17 Gram(s) Oral daily  senna 2 Tablet(s) Oral at bedtime    MEDICATIONS  (PRN):  ondansetron Injectable 4 milliGRAM(s) IV Push every 8 hours PRN Nausea and/or Vomiting      Review of systems:    Constitutional: No fever, weight loss or fatigue    Eyes: No eye pain or discharge  ENMT:  No difficulty hearing; No sinus or throat pain  Neck: No pain or stiffness  Respiratory: No cough, wheezing, chills or hemoptysis  Cardiovascular: No chest pain, palpitations, shortness of breath, dyspnea on exertion  Gastrointestinal: No abdominal pain, nausea, vomiting or hematemesis; No diarrhea or constipation.   Genitourinary: No dysuria, frequency, hematuria or incontinence  Neurological: As per HPI  Skin: No rashes or lesions   Endocrine: No heat or cold intolerance; No hair loss  Musculoskeletal: No joint pain or swelling  Psychiatric: No depression, anxiety, mood swings  Heme/Lymph: No easy bruising or bleeding gums    Vital Signs Last 24 Hrs  T(C): 37.1 (2021 22:26), Max: 37.1 (2021 22:26)  T(F): 98.7 (2021 22:26), Max: 98.7 (2021 22:26)  HR: 95 (2021 22:26) (65 - 95)  BP: 145/60 (2021 22:26) (145/60 - 176/79)  BP(mean): --  RR: 18 (2021 22:26) (16 - 18)  SpO2: 99% (2021 15:25) (99% - 99%)    Neurologic Examination:  General:  Appearance is consistent with chronologic age.    General: The patient is oriented to person, place, time and date.  Recent and remote memory intact.  Fund of knowledge is intact and normal.  Language with normal repetition, comprehension and naming.  Nondysarthric.    Cranial nerves: intact VA, VFF.  EOMI w/o nystagmus, skew or reported double vision.  PERRL.  No ptosis/weakness of eyelid closure.  Facial sensation is normal with normal bite.  No facial asymmetry.  Hearing grossly intact b/l.  Palate elevates midline.  Tongue midline.  Motor examination:   Normal tone, bulk and range of motion.  No tenderness, twitching, tremors or involuntary movements.  Formal Muscle Strength Testing: (MRC grade R/L) 5/5 UE; 5/5 LE.  No observable drift.  Reflexes:   2+ b/l  biceps, triceps, brachioradialis, patella and Achilles.  Plantar response downgoing b/l.    Sensory examination:   Intact to light touch and pinprick, pain, temperature and proprioception and vibration in all extremities.  Cerebellum:   FTN/HKS intact with normal EL in all limbs.  No dysmetria or dysdiadokinesia.   Abdomen:  TTP     Labs:   CBC Full  -  ( 2021 11:29 )  WBC Count : 6.93 K/uL  RBC Count : 3.93 M/uL  Hemoglobin : 11.7 g/dL  Hematocrit : 36.1 %  Platelet Count - Automated : 120 K/uL  Mean Cell Volume : 91.9 fL  Mean Cell Hemoglobin : 29.8 pg  Mean Cell Hemoglobin Concentration : 32.4 g/dL  Auto Neutrophil # : 4.46 K/uL  Auto Lymphocyte # : 1.75 K/uL  Auto Monocyte # : 0.60 K/uL  Auto Eosinophil # : 0.08 K/uL  Auto Basophil # : 0.03 K/uL  Auto Neutrophil % : 64.3 %  Auto Lymphocyte % : 25.3 %  Auto Monocyte % : 8.7 %  Auto Eosinophil % : 1.2 %  Auto Basophil % : 0.4 %        138  |  105  |  13  ----------------------------<  123<H>  4.4   |  22  |  1.0    Ca    9.3      2021 11:29  Mg     2.1         TPro  6.9  /  Alb  4.2  /  TBili  0.6  /  DBili  x   /  AST  23  /  ALT  10  /  AlkPhos  69      LIVER FUNCTIONS - ( 2021 11:29 )  Alb: 4.2 g/dL / Pro: 6.9 g/dL / ALK PHOS: 69 U/L / ALT: 10 U/L / AST: 23 U/L / GGT: x             Urinalysis Basic - ( 2021 14:10 )    Color: Light Yellow / Appearance: Clear / S.023 / pH: x  Gluc: x / Ketone: Negative  / Bili: Negative / Urobili: <2 mg/dL   Blood: x / Protein: 30 mg/dL / Nitrite: Positive   Leuk Esterase: Moderate / RBC: 1 /HPF / WBC 14 /HPF   Sq Epi: x / Non Sq Epi: 3 /HPF / Bacteria: Many          Neuroimaging:  NCHCT: CT Head No Cont:   EXAM:  CT BRAIN            PROCEDURE DATE:  2021          INTERPRETATION:  CLINICAL INDICATION: Dizziness    Technique: CT of the head was performed without contrast.    Multiple contiguous axial images were acquired from the skullbase to the vertex without the administration of intravenous contrast.  Coronal and sagittal reformations were made.    COMPARISON:  prior head CT dated 2021    FINDINGS:    The ventricles and sulci are unremarkable in appearance.    There is periventricular and scattered subcortical white matter hypodensity. There is no intraparenchymal hematoma, mass effect or midline shift. No abnormal extra-axial fluid collections are present.    The calvarium is intact. There is sclerosis of the bilateral mastoid air cells.. Paranasal sinuses are unremarkable.    IMPRESSION:  No significant change since recent head CT of 2021.    No acute intracranial pathology. No evidence of midline shift, mass effect or intracranial hemorrhage.    Mild chronic microvascular-type changes.          NARESH JORDAN M.D., ATTENDING RADIOLOGIST  This document has been electronically signed. 2021 12:04PM (21 @ 11:57)      Assessment:  This is a 88y Female with h/o previous dizzy episodes over year and history of headaches present with abdominal pain concerning for infection.  Neurologic consult was for dizziness.  Patient dizziness does not appear to be a positional vertigo.  There may be an orthostatic component.      Plan:   1.  W/u and treat for possible colitis.  2.  May use meclizine prn.  3.  Hydration.  4.  Tylenol 1-2 tab q6h prn for headache.      21 @ 22:48

## 2021-04-09 NOTE — ED PROVIDER NOTE - CLINICAL SUMMARY MEDICAL DECISION MAKING FREE TEXT BOX
87 y/o D pmh htn, thyroid d/o, hx vertigo, p/w abd discomfort, nausea and dizziness x1-2d. Seen for same 5d ago, neg CTH and neg blood work; CT abd + large fecal load, dc home. Since then pt having daily BM, last today. Dizziness similar to prior but seems more intense, and w/ some blurry vision. NO fever, cp, sob, uri sx.    PE: NAD; MMM; EOMI, + L horizontal nystagmus; PERRL; neck supple; RRR, CTAB, Abd soft; + ttp LLQ, no r/g. neuro grossly non focal + dizziness/ roiom spinning  and nausea w/ position change.    IMP: vertigo/ dizziness, abd pain.  P: Labs, CTH, CT abd/ pel, ivf, meclizine, analgesia, reassess. 89 y/o D pmh htn, thyroid d/o, hx vertigo, p/w abd discomfort, nausea and dizziness x1-2d. Seen for same 5d ago, neg CTH and neg blood work; CT abd + large fecal load, dc home. Since then pt having daily BM, last today. Dizziness similar to prior but seems more intense, and w/ some blurry vision. NO fever, cp, sob, uri sx.    PE: NAD; MMM; EOMI, + L horizontal nystagmus; PERRL; neck supple; RRR, CTAB, Abd soft; + ttp LLQ, no r/g. neuro grossly non focal + dizziness/ roiom spinning  and nausea w/ position change.    IMP: vertigo/ dizziness, abd pain.  P: Labs, CTH, CT abd/ pel, ivf, meclizine, analgesia, reassess.    No acute ED events. Pt still felt some mild dizziness. Could not PO.   + colitis and UTI on w/up. Pt admitted to medicine for failure to PO in setting of colitis and for cont dizziness.

## 2021-04-09 NOTE — H&P ADULT - ASSESSMENT
88 yr F with HTN and Vertigo and hypothyroidism presented jag ER due to abd pain, nausea and vomiting      #abd pain and vomiting likley related to infectious VS inflammatory colitis, malignancy and IBD should be considered after resolution of pain as there is different sites of thickening on CT scan  Right colon, rectosigmoidal and lower oesophageal (on CT 4/4),   - sepsis not present  - start Rocephin and flagyl   - send lactate  - start clear liquid and IVF  - start bowel regimen, Miralax, senna and one dose lactulose     #pt doesn't know her pharmacy, called her demario nd daughter with no reponse, plz verify med req in AM if possible     #HTN    #Vertigo; meclizine PRN    #hypothyroidism    #Diet: CLL for now  #DVT pro: Lovenox  #GI pro: not  indicated   #Dispo: from home

## 2021-04-09 NOTE — ED PROVIDER NOTE - CARE PLAN
Principal Discharge DX:	Colitis  Secondary Diagnosis:	UTI (urinary tract infection)  Secondary Diagnosis:	Dizziness

## 2021-04-09 NOTE — ED PROVIDER NOTE - PHYSICAL EXAMINATION
Gen: NAD, AOx3  Head: NCAT  HEENT: PERRL, oral mucosa moist, normal conjunctiva, oropharynx clear without exudate or erythema  Lung: CTAB, no respiratory distress, no wheezing, rales, rhonchi  CV: normal s1/s2, rrr, Normal perfusion, pulses 2+ throughout  Abd: soft, LUQ tenderness w/ no rebounding/guarding, ND, no CVA tenderness  Genitourinary: no pelvic tenderness  MSK: No edema, no visible deformities, full range of motion in all 4 extremities  Neuro: CN II-XII grossly intact, No focal neurologic deficits, no nystagmus/pronator drift, coordination/sensation intact, strength 5/5 BUE/BLE  Skin: No rash   Psych: normal affect

## 2021-04-09 NOTE — H&P ADULT - NSHPPHYSICALEXAM_GEN_ALL_CORE
CONSTITUTIONAL: looks dry and in pain,   HEAD: Atraumatic, normocephalic  EYES: EOM intact, PERRLA, conjunctiva and sclera clear  ENT: Supple, no masses, no thyromegaly, no bruits, no JVD; moist mucous membranes  PULMONARY: Clear to auscultation bilaterally; no wheezes, rales, or rhonchi  CARDIOVASCULAR: Regular rate and rhythm; no murmurs, rubs, or gallops  GASTROINTESTINAL: Soft, jennifer epigastric and LLQ abd, no rebound or gaurding, non-distended; bowel sounds present  MUSCULOSKELETAL: 2+ peripheral pulses; no clubbing, no cyanosis, no edema  NEUROLOGY: non-focal  SKIN: No rashes or lesions; warm and dry Vital Signs Last 24 Hrs  T(C): 36.5 (09 Apr 2021 15:25), Max: 36.6 (09 Apr 2021 09:32)  T(F): 97.7 (09 Apr 2021 15:25), Max: 97.9 (09 Apr 2021 09:32)  HR: 65 (09 Apr 2021 15:25) (65 - 87)  BP: 176/79 (09 Apr 2021 15:25) (164/74 - 176/79)  BP(mean): --  RR: 16 (09 Apr 2021 15:25) (16 - 16)  SpO2: 99% (09 Apr 2021 15:25) (99% - 99%)    CONSTITUTIONAL: looks dry and in pain,   HEAD: Atraumatic, normocephalic  EYES: EOM intact, PERRLA, conjunctiva and sclera clear  ENT: Supple, no masses, no thyromegaly, no bruits, no JVD; moist mucous membranes  PULMONARY: Clear to auscultation bilaterally; no wheezes, rales, or rhonchi  CARDIOVASCULAR: Regular rate and rhythm; no murmurs, rubs, or gallops  GASTROINTESTINAL: Soft, jennifer epigastric and LLQ abd, no rebound or gaurding, non-distended; bowel sounds present  MUSCULOSKELETAL: 2+ peripheral pulses; no clubbing, no cyanosis, no edema  NEUROLOGY: non-focal  SKIN: No rashes or lesions; warm and dry

## 2021-04-09 NOTE — ED PROVIDER NOTE - OBJECTIVE STATEMENT
87 yo female with a pmh of HTN and hypothyroid presents c/o umbilical abdominal pain. pt was seen in the ED recently with a negative workup and states the pain initially dissipated after passed her bowels but returned for the past 2 days. pt described the pain as aching in nature with no radiation and has experienced 3 episodes of NBNB emesis yesterday. pt notes aggravation of pain with food and has not noted any alleviating symptoms. denies any other symptoms including fevers, chill, headache, recent illness/travel, cough, chest pain, or SOB.

## 2021-04-09 NOTE — PATIENT PROFILE ADULT - FALL HARM RISK TYPE OF ASSESSMENT
admission HPI:  79M PMH Parkinson's disease (s/p stem cell transplant currently in an FDA trial) presented with agitation, visual hallucinations, urinary incontinence and a fall. Of note, patient was seen at [facility] on 6/19/2017 for syncope, offered admission for syncope; however, left AMA. When patient was at home overnight he was having urinary incontinence, agitation, and was unable to walk.  Patient was noted to be agitated overnight with visual hallucinations, and generalized weakness.  Wife noted that she was unable to move him this morning. Family called neurologist who referred the patient to the ER for a lumbar puncture and evaluation by neurology and neurosurgery.       In ED T: 100.1  BP: 119/68  HR: 70  RR: 16  SpO2: 98%RA. Patient provided CThead demonstrating no intracranial derangements. BCx taken and Neurology consulted, performed LP with 6cc removed, opening pressure noted to be [id number].      Hospital Course:  Patient was admitted to medicine in setting of altered mental status, urinary incontinence and fall in setting of possible NPH vs progression of underlying Parkinson's Disease. Patient had CT head performed on admission consistent with no acute intracranial hemorrhage or midline shift, prominent ventricular size out of proportion to cerebral atrophy with effacement of sulci near vortex suggestive of NPH. Neurology was consulted and performed large volume LP. He did not have improvement of his gait, suggesting that the underlying process was not NPH. He was restarted and titrated on Sinamet. Physical therapy was consulted and recommended acute rehab facility. HPI:  79M PMH Parkinson's disease (s/p stem cell transplant currently in an FDA trial) presented with agitation, visual hallucinations, urinary incontinence and a fall. Of note, patient was seen at [facility] on 6/19/2017 for syncope, offered admission for syncope; however, left AMA. When patient was at home overnight he was having urinary incontinence, agitation, and was unable to walk.  Patient was noted to be agitated overnight with visual hallucinations, and generalized weakness.  Wife noted that she was unable to move him this morning. Family called neurologist who referred the patient to the ER for a lumbar puncture and evaluation by neurology and neurosurgery.       In ED T: 100.1  BP: 119/68  HR: 70  RR: 16  SpO2: 98%RA. Patient provided CThead demonstrating no intracranial derangements. BCx taken and Neurology consulted, performed LP with 6cc removed, opening pressure noted to be [id number].       Hospital Course:  Patient was admitted to medicine in setting of altered mental status, urinary incontinence and fall in setting of possible NPH vs progression of underlying Parkinson's Disease. Patient had CT head performed on admission consistent with no acute intracranial hemorrhage or midline shift, prominent ventricular size out of proportion to cerebral atrophy with effacement of sulci near vortex suggestive of NPH. Neurology was consulted and performed large volume LP. He did not have improvement of his gait, suggesting that the underlying process was not NPH. He was restarted and titrated on Sinamet. Physical therapy was consulted and recommended rehab facility; PM+R determined that patient was not a candidate for acute rehab. HPI:  79M PMH Parkinson's disease (s/p stem cell transplant currently in an FDA trial) presented with agitation, visual hallucinations, urinary incontinence and a fall. Of note, patient was seen at [facility] on 6/19/2017 for syncope, offered admission for syncope; however, left AMA. When patient was at home overnight he was having urinary incontinence, agitation, and was unable to walk.  Patient was noted to be agitated overnight with visual hallucinations, and generalized weakness.  Wife noted that she was unable to move him this morning. Family called neurologist who referred the patient to the ER for a lumbar puncture and evaluation by neurology and neurosurgery.       In ED T: 100.1  BP: 119/68  HR: 70  RR: 16  SpO2: 98%RA. Patient provided CThead demonstrating no intracranial derangements. BCx taken and Neurology consulted, performed LP with 6cc removed, opening pressure noted to be [id number].       Hospital Course:  Patient was admitted to medicine in setting of altered mental status, urinary incontinence and fall in setting of possible NPH vs progression of underlying Parkinson's Disease. Patient had CT head performed on admission consistent with no acute intracranial hemorrhage or midline shift, prominent ventricular size out of proportion to cerebral atrophy with effacement of sulci near vortex suggestive of NPH. Neurology was consulted and performed large volume LP. He did not have improvement of his gait, suggesting that the underlying process was not NPH. He was thought to have progression of his parkinson's disease, with possible autonomic instability. He was restarted and titrated on Sinamet. Physical therapy was consulted and recommended rehab facility; PM+R determined that patient was not a candidate for acute rehab.

## 2021-04-10 LAB
ALBUMIN SERPL ELPH-MCNC: 4.3 G/DL — SIGNIFICANT CHANGE UP (ref 3.5–5.2)
ALP SERPL-CCNC: 84 U/L — SIGNIFICANT CHANGE UP (ref 30–115)
ALT FLD-CCNC: 9 U/L — SIGNIFICANT CHANGE UP (ref 0–41)
ANION GAP SERPL CALC-SCNC: 15 MMOL/L — HIGH (ref 7–14)
AST SERPL-CCNC: 19 U/L — SIGNIFICANT CHANGE UP (ref 0–41)
BASOPHILS # BLD AUTO: 0.03 K/UL — SIGNIFICANT CHANGE UP (ref 0–0.2)
BASOPHILS NFR BLD AUTO: 0.3 % — SIGNIFICANT CHANGE UP (ref 0–1)
BILIRUB SERPL-MCNC: 0.6 MG/DL — SIGNIFICANT CHANGE UP (ref 0.2–1.2)
BUN SERPL-MCNC: 10 MG/DL — SIGNIFICANT CHANGE UP (ref 10–20)
CALCIUM SERPL-MCNC: 9.5 MG/DL — SIGNIFICANT CHANGE UP (ref 8.5–10.1)
CEA SERPL-MCNC: 3 NG/ML — SIGNIFICANT CHANGE UP (ref 0–3.8)
CHLORIDE SERPL-SCNC: 107 MMOL/L — SIGNIFICANT CHANGE UP (ref 98–110)
CO2 SERPL-SCNC: 20 MMOL/L — SIGNIFICANT CHANGE UP (ref 17–32)
COVID-19 SPIKE DOMAIN AB INTERP: POSITIVE
COVID-19 SPIKE DOMAIN ANTIBODY RESULT: 109 U/ML — HIGH
CREAT SERPL-MCNC: 1.2 MG/DL — SIGNIFICANT CHANGE UP (ref 0.7–1.5)
CRP SERPL-MCNC: <3 MG/L — SIGNIFICANT CHANGE UP
EOSINOPHIL # BLD AUTO: 0.09 K/UL — SIGNIFICANT CHANGE UP (ref 0–0.7)
EOSINOPHIL NFR BLD AUTO: 0.8 % — SIGNIFICANT CHANGE UP (ref 0–8)
GLUCOSE SERPL-MCNC: 103 MG/DL — HIGH (ref 70–99)
HCT VFR BLD CALC: 37.1 % — SIGNIFICANT CHANGE UP (ref 37–47)
HGB BLD-MCNC: 11.9 G/DL — LOW (ref 12–16)
IMM GRANULOCYTES NFR BLD AUTO: 0.3 % — SIGNIFICANT CHANGE UP (ref 0.1–0.3)
LACTATE SERPL-SCNC: 0.8 MMOL/L — SIGNIFICANT CHANGE UP (ref 0.7–2)
LYMPHOCYTES # BLD AUTO: 2.32 K/UL — SIGNIFICANT CHANGE UP (ref 1.2–3.4)
LYMPHOCYTES # BLD AUTO: 20.2 % — LOW (ref 20.5–51.1)
MAGNESIUM SERPL-MCNC: 2 MG/DL — SIGNIFICANT CHANGE UP (ref 1.8–2.4)
MCHC RBC-ENTMCNC: 29.4 PG — SIGNIFICANT CHANGE UP (ref 27–31)
MCHC RBC-ENTMCNC: 32.1 G/DL — SIGNIFICANT CHANGE UP (ref 32–37)
MCV RBC AUTO: 91.6 FL — SIGNIFICANT CHANGE UP (ref 81–99)
MONOCYTES # BLD AUTO: 0.88 K/UL — HIGH (ref 0.1–0.6)
MONOCYTES NFR BLD AUTO: 7.7 % — SIGNIFICANT CHANGE UP (ref 1.7–9.3)
NEUTROPHILS # BLD AUTO: 8.11 K/UL — HIGH (ref 1.4–6.5)
NEUTROPHILS NFR BLD AUTO: 70.7 % — SIGNIFICANT CHANGE UP (ref 42.2–75.2)
NRBC # BLD: 0 /100 WBCS — SIGNIFICANT CHANGE UP (ref 0–0)
PLATELET # BLD AUTO: 231 K/UL — SIGNIFICANT CHANGE UP (ref 130–400)
POTASSIUM SERPL-MCNC: 3.6 MMOL/L — SIGNIFICANT CHANGE UP (ref 3.5–5)
POTASSIUM SERPL-SCNC: 3.6 MMOL/L — SIGNIFICANT CHANGE UP (ref 3.5–5)
PROT SERPL-MCNC: 6.6 G/DL — SIGNIFICANT CHANGE UP (ref 6–8)
RBC # BLD: 4.05 M/UL — LOW (ref 4.2–5.4)
RBC # FLD: 14 % — SIGNIFICANT CHANGE UP (ref 11.5–14.5)
SARS-COV-2 IGG+IGM SERPL QL IA: 109 U/ML — HIGH
SARS-COV-2 IGG+IGM SERPL QL IA: POSITIVE
SODIUM SERPL-SCNC: 142 MMOL/L — SIGNIFICANT CHANGE UP (ref 135–146)
TSH SERPL-MCNC: 52.6 UIU/ML — HIGH (ref 0.27–4.2)
WBC # BLD: 11.47 K/UL — HIGH (ref 4.8–10.8)
WBC # FLD AUTO: 11.47 K/UL — HIGH (ref 4.8–10.8)

## 2021-04-10 PROCEDURE — 99233 SBSQ HOSP IP/OBS HIGH 50: CPT

## 2021-04-10 RX ORDER — MECLIZINE HCL 12.5 MG
25 TABLET ORAL EVERY 8 HOURS
Refills: 0 | Status: DISCONTINUED | OUTPATIENT
Start: 2021-04-10 | End: 2021-04-14

## 2021-04-10 RX ORDER — LISINOPRIL 2.5 MG/1
10 TABLET ORAL DAILY
Refills: 0 | Status: DISCONTINUED | OUTPATIENT
Start: 2021-04-10 | End: 2021-04-10

## 2021-04-10 RX ORDER — NIFEDIPINE 30 MG
60 TABLET, EXTENDED RELEASE 24 HR ORAL DAILY
Refills: 0 | Status: DISCONTINUED | OUTPATIENT
Start: 2021-04-10 | End: 2021-04-14

## 2021-04-10 RX ADMIN — POLYETHYLENE GLYCOL 3350 17 GRAM(S): 17 POWDER, FOR SOLUTION ORAL at 11:39

## 2021-04-10 RX ADMIN — Medication 100 MILLIGRAM(S): at 15:21

## 2021-04-10 RX ADMIN — CEFTRIAXONE 100 MILLIGRAM(S): 500 INJECTION, POWDER, FOR SOLUTION INTRAMUSCULAR; INTRAVENOUS at 02:04

## 2021-04-10 RX ADMIN — SENNA PLUS 2 TABLET(S): 8.6 TABLET ORAL at 21:33

## 2021-04-10 RX ADMIN — Medication 100 MILLIGRAM(S): at 05:26

## 2021-04-10 RX ADMIN — ONDANSETRON 4 MILLIGRAM(S): 8 TABLET, FILM COATED ORAL at 13:07

## 2021-04-10 RX ADMIN — Medication 100 MILLIGRAM(S): at 21:33

## 2021-04-10 RX ADMIN — ENOXAPARIN SODIUM 40 MILLIGRAM(S): 100 INJECTION SUBCUTANEOUS at 21:33

## 2021-04-10 NOTE — CHART NOTE - NSCHARTNOTEFT_GEN_A_CORE
Per AM rounds,     Pt feels well, no nausea/vomiting. Home med rec confirmed with the son, Christian Torres over the phone.     -Advance diet as tolerated.  -C/w with gentle hydration given worsening Cr -d/c in 24 hours if pt eating well.   -F/U urine cx  -C/w Rocephin and Flagyl for now  -Restart home meds (Will hold ace-i for now)   -Check orthostat vitals  -Will put in for PT consult    Dispo: Possible d/c within 24-48 hours Per AM rounds,     Pt feels well, no nausea/vomiting. Home med rec confirmed with the son, Christian Torres over the phone.     -Advance diet as tolerated.  -C/w with gentle hydration given worsening Cr -d/c in 24 hours if pt eating well.   -F/U urine cx  -C/w Rocephin and Flagyl for now  -Restart home meds (Will hold ace-i for now)   -Check orthostat vitals  -Pt not on Levothyroxine per son at home, but has hx of hypothyroidism? -Check TSH in AM  -Will put in for PT consult    Dispo: Possible d/c within 24-48 hours

## 2021-04-10 NOTE — PROVIDER CONTACT NOTE (OTHER) - BACKGROUND
Advanced to DASH diet at lunch today w/ c/o nausea at lunch resolved w/ zofran per order; now does not  c/o nausea w/ dinner.

## 2021-04-11 LAB
ALBUMIN SERPL ELPH-MCNC: 3.6 G/DL — SIGNIFICANT CHANGE UP (ref 3.5–5.2)
ALP SERPL-CCNC: 69 U/L — SIGNIFICANT CHANGE UP (ref 30–115)
ALT FLD-CCNC: 6 U/L — SIGNIFICANT CHANGE UP (ref 0–41)
ANION GAP SERPL CALC-SCNC: 9 MMOL/L — SIGNIFICANT CHANGE UP (ref 7–14)
AST SERPL-CCNC: 15 U/L — SIGNIFICANT CHANGE UP (ref 0–41)
BASOPHILS # BLD AUTO: 0.03 K/UL — SIGNIFICANT CHANGE UP (ref 0–0.2)
BASOPHILS NFR BLD AUTO: 0.4 % — SIGNIFICANT CHANGE UP (ref 0–1)
BILIRUB SERPL-MCNC: 0.4 MG/DL — SIGNIFICANT CHANGE UP (ref 0.2–1.2)
BUN SERPL-MCNC: 9 MG/DL — LOW (ref 10–20)
CALCIUM SERPL-MCNC: 9.3 MG/DL — SIGNIFICANT CHANGE UP (ref 8.5–10.1)
CHLORIDE SERPL-SCNC: 106 MMOL/L — SIGNIFICANT CHANGE UP (ref 98–110)
CO2 SERPL-SCNC: 24 MMOL/L — SIGNIFICANT CHANGE UP (ref 17–32)
CREAT SERPL-MCNC: 1.2 MG/DL — SIGNIFICANT CHANGE UP (ref 0.7–1.5)
EOSINOPHIL # BLD AUTO: 0.31 K/UL — SIGNIFICANT CHANGE UP (ref 0–0.7)
EOSINOPHIL NFR BLD AUTO: 4.3 % — SIGNIFICANT CHANGE UP (ref 0–8)
GLUCOSE SERPL-MCNC: 100 MG/DL — HIGH (ref 70–99)
HCT VFR BLD CALC: 34.5 % — LOW (ref 37–47)
HGB BLD-MCNC: 11.2 G/DL — LOW (ref 12–16)
IMM GRANULOCYTES NFR BLD AUTO: 0.4 % — HIGH (ref 0.1–0.3)
LYMPHOCYTES # BLD AUTO: 2.53 K/UL — SIGNIFICANT CHANGE UP (ref 1.2–3.4)
LYMPHOCYTES # BLD AUTO: 34.7 % — SIGNIFICANT CHANGE UP (ref 20.5–51.1)
MAGNESIUM SERPL-MCNC: 2 MG/DL — SIGNIFICANT CHANGE UP (ref 1.8–2.4)
MCHC RBC-ENTMCNC: 29.9 PG — SIGNIFICANT CHANGE UP (ref 27–31)
MCHC RBC-ENTMCNC: 32.5 G/DL — SIGNIFICANT CHANGE UP (ref 32–37)
MCV RBC AUTO: 92.2 FL — SIGNIFICANT CHANGE UP (ref 81–99)
MONOCYTES # BLD AUTO: 0.81 K/UL — HIGH (ref 0.1–0.6)
MONOCYTES NFR BLD AUTO: 11.1 % — HIGH (ref 1.7–9.3)
NEUTROPHILS # BLD AUTO: 3.58 K/UL — SIGNIFICANT CHANGE UP (ref 1.4–6.5)
NEUTROPHILS NFR BLD AUTO: 49.1 % — SIGNIFICANT CHANGE UP (ref 42.2–75.2)
NRBC # BLD: 0 /100 WBCS — SIGNIFICANT CHANGE UP (ref 0–0)
PLATELET # BLD AUTO: 208 K/UL — SIGNIFICANT CHANGE UP (ref 130–400)
POTASSIUM SERPL-MCNC: 3.8 MMOL/L — SIGNIFICANT CHANGE UP (ref 3.5–5)
POTASSIUM SERPL-SCNC: 3.8 MMOL/L — SIGNIFICANT CHANGE UP (ref 3.5–5)
PROT SERPL-MCNC: 5.7 G/DL — LOW (ref 6–8)
RBC # BLD: 3.74 M/UL — LOW (ref 4.2–5.4)
RBC # FLD: 14 % — SIGNIFICANT CHANGE UP (ref 11.5–14.5)
SODIUM SERPL-SCNC: 139 MMOL/L — SIGNIFICANT CHANGE UP (ref 135–146)
WBC # BLD: 7.29 K/UL — SIGNIFICANT CHANGE UP (ref 4.8–10.8)
WBC # FLD AUTO: 7.29 K/UL — SIGNIFICANT CHANGE UP (ref 4.8–10.8)

## 2021-04-11 PROCEDURE — 99232 SBSQ HOSP IP/OBS MODERATE 35: CPT

## 2021-04-11 RX ADMIN — ONDANSETRON 4 MILLIGRAM(S): 8 TABLET, FILM COATED ORAL at 21:00

## 2021-04-11 RX ADMIN — SENNA PLUS 2 TABLET(S): 8.6 TABLET ORAL at 21:01

## 2021-04-11 RX ADMIN — Medication 100 MILLIGRAM(S): at 21:00

## 2021-04-11 RX ADMIN — ENOXAPARIN SODIUM 40 MILLIGRAM(S): 100 INJECTION SUBCUTANEOUS at 21:01

## 2021-04-11 RX ADMIN — Medication 100 MILLIGRAM(S): at 14:05

## 2021-04-11 RX ADMIN — Medication 60 MILLIGRAM(S): at 05:20

## 2021-04-11 RX ADMIN — POLYETHYLENE GLYCOL 3350 17 GRAM(S): 17 POWDER, FOR SOLUTION ORAL at 11:45

## 2021-04-11 RX ADMIN — CEFTRIAXONE 100 MILLIGRAM(S): 500 INJECTION, POWDER, FOR SOLUTION INTRAMUSCULAR; INTRAVENOUS at 01:05

## 2021-04-11 RX ADMIN — Medication 100 MILLIGRAM(S): at 05:20

## 2021-04-12 ENCOUNTER — TRANSCRIPTION ENCOUNTER (OUTPATIENT)
Age: 86
End: 2021-04-12

## 2021-04-12 LAB
AUTO DIFF PNL BLD: ABNORMAL
C-ANCA SER-ACNC: NEGATIVE — SIGNIFICANT CHANGE UP
P-ANCA SER-ACNC: NEGATIVE — SIGNIFICANT CHANGE UP
TSH SERPL-MCNC: 78.4 UIU/ML — HIGH (ref 0.27–4.2)

## 2021-04-12 PROCEDURE — 99233 SBSQ HOSP IP/OBS HIGH 50: CPT

## 2021-04-12 RX ORDER — LISINOPRIL 2.5 MG/1
10 TABLET ORAL DAILY
Refills: 0 | Status: DISCONTINUED | OUTPATIENT
Start: 2021-04-12 | End: 2021-04-14

## 2021-04-12 RX ORDER — CIPROFLOXACIN LACTATE 400MG/40ML
1 VIAL (ML) INTRAVENOUS
Qty: 14 | Refills: 0
Start: 2021-04-12 | End: 2021-04-18

## 2021-04-12 RX ORDER — METRONIDAZOLE 500 MG
1 TABLET ORAL
Qty: 21 | Refills: 0
Start: 2021-04-12 | End: 2021-04-18

## 2021-04-12 RX ORDER — LEVOTHYROXINE SODIUM 125 MCG
75 TABLET ORAL DAILY
Refills: 0 | Status: DISCONTINUED | OUTPATIENT
Start: 2021-04-12 | End: 2021-04-14

## 2021-04-12 RX ADMIN — ENOXAPARIN SODIUM 40 MILLIGRAM(S): 100 INJECTION SUBCUTANEOUS at 21:25

## 2021-04-12 RX ADMIN — SENNA PLUS 2 TABLET(S): 8.6 TABLET ORAL at 21:24

## 2021-04-12 RX ADMIN — Medication 60 MILLIGRAM(S): at 06:12

## 2021-04-12 RX ADMIN — POLYETHYLENE GLYCOL 3350 17 GRAM(S): 17 POWDER, FOR SOLUTION ORAL at 13:13

## 2021-04-12 RX ADMIN — Medication 100 MILLIGRAM(S): at 06:12

## 2021-04-12 RX ADMIN — SODIUM CHLORIDE 50 MILLILITER(S): 9 INJECTION, SOLUTION INTRAVENOUS at 01:25

## 2021-04-12 RX ADMIN — Medication 100 MILLIGRAM(S): at 21:24

## 2021-04-12 RX ADMIN — Medication 100 MILLIGRAM(S): at 13:12

## 2021-04-12 RX ADMIN — LISINOPRIL 10 MILLIGRAM(S): 2.5 TABLET ORAL at 13:12

## 2021-04-12 RX ADMIN — CEFTRIAXONE 100 MILLIGRAM(S): 500 INJECTION, POWDER, FOR SOLUTION INTRAMUSCULAR; INTRAVENOUS at 01:24

## 2021-04-12 NOTE — DISCHARGE NOTE PROVIDER - HOSPITAL COURSE
88 yr F with HTN and Vertigo and hypothyroidism presented [facility] ER due to abd pain, nausea and vomiting  the pt had difficulty hearing, i called the son and the daughter but no one responded to the calls, pt able to give some Hx,     the pt has been having abd pain since one week, pain is epigastric and LLQ, colicky 7/10, worse with food intake, associated with nausea and 3x vomiting yesterday,  pt had CT abd on 4/4 and showed large stool burden and lower esophageal thickening, pt was sent home with conservative treatment, and has been having daily BM.   pt cont to have pain and also now with vomiting and nausea, no fever, chest pain, seizure, syncope, urinary symptoms, melena, bloody vomiting, bloody BM.    Acute Colitis:   Abdominal pain  CT abdomen showed mild mural thickening involving the right colon and rectosigmoid. Could be colitis vs underdistention.   Due to abdominal pain will treat with antibiotics.   Continue Rocephin and Flagyl  Discontinue IV fluid.   Bowel regimen. Advance diet as tolerated.  UTI:   Continue Rocephin, unfortunately no urine cx sent.   HTN: Continue Nifedipine.     Patient is clinically stable, discharged home. tolerating diet, having BM, abdomen pain improved, ambulating. will be going home with 7 days of abx for both colitis and UTI. 88 yr F with HTN and Vertigo and hypothyroidism presented [facility] ER due to abd pain, nausea and vomiting  the pt had difficulty hearing, i called the son and the daughter but no one responded to the calls, pt able to give some Hx,     the pt has been having abd pain since one week, pain is epigastric and LLQ, colicky 7/10, worse with food intake, associated with nausea and 3x vomiting yesterday,  pt had CT abd on 4/4 and showed large stool burden and lower esophageal thickening, pt was sent home with conservative treatment, and has been having daily BM.   pt cont to have pain and also now with vomiting and nausea, no fever, chest pain, seizure, syncope, urinary symptoms, melena, bloody vomiting, bloody BM.    # Acute Colitis  -  CT Abdomen and Pelvis w/ IV Cont (04.09.21 @ 12:58) >Apparent mural thickening involving the right colon and rectosigmoid. No significant surrounding inflammatory changes noted. Findings may be on the basis of underdistention with mild colitis not excluded.  - c/w  cipro flagyl for 7 days    # UTI  - c/w cipro    # Hypertension- uncontrolled  - c/w procardia, benazepril    # Hypothyroidism  - Thyroid Stimulating Hormone, Serum: 52.60 uIU/mL (04.10.21 @ 05:53)  - c/w  synthroid  - recheck TSH in 6 weeks    # CKD stage3 stable

## 2021-04-12 NOTE — DISCHARGE NOTE PROVIDER - NSDCCPCAREPLAN_GEN_ALL_CORE_FT
PRINCIPAL DISCHARGE DIAGNOSIS  Diagnosis: Colitis  Assessment and Plan of Treatment: complete antibiotic course as prescribed and follow up with primary care doctor in 1 week.      SECONDARY DISCHARGE DIAGNOSES  Diagnosis: UTI (urinary tract infection)  Assessment and Plan of Treatment:     Diagnosis: Dizziness  Assessment and Plan of Treatment:

## 2021-04-12 NOTE — DISCHARGE NOTE PROVIDER - NSDCMRMEDTOKEN_GEN_ALL_CORE_FT
benazepril 10 mg oral tablet: 1 tab(s) orally once a day  ciprofloxacin 500 mg oral tablet: 1 tab(s) orally 2 times a day   Flagyl 500 mg oral tablet: 1 tab(s) orally 3 times a day   meclizine 25 mg oral tablet: 1 tab(s) orally every 8 hours   Nifedical XL 60 mg oral tablet, extended release: 1 tab(s) orally once a day   ATORVASTATIN 10MG TABLETS: 1 tab(s) orally once a day (at bedtime)  benazepril 10 mg oral tablet: 1 tab(s) orally once a day  ciprofloxacin 500 mg oral tablet: 1 tab(s) orally 2 times a day   Flagyl 500 mg oral tablet: 1 tab(s) orally 3 times a day   LEVOTHYROXINE 0.075MG (75MCG) TABS: 1 tab(s) orally once a day  meclizine 25 mg oral tablet: 1 tab(s) orally every 8 hours   Nifedical XL 60 mg oral tablet, extended release: 1 tab(s) orally once a day  Protonix 40 mg oral delayed release tablet: 1 tab(s) orally once a day

## 2021-04-12 NOTE — DISCHARGE NOTE PROVIDER - CARE PROVIDER_API CALL
Ezra Arango  INTERNAL MEDICINE  [address] [place] [address], [facility] [place] [address]  Phone: [phone]  Fax: [phone]  Follow Up Time: 1 week

## 2021-04-13 LAB
ALBUMIN SERPL ELPH-MCNC: 4 G/DL — SIGNIFICANT CHANGE UP (ref 3.5–5.2)
ALP SERPL-CCNC: 66 U/L — SIGNIFICANT CHANGE UP (ref 30–115)
ALT FLD-CCNC: 9 U/L — SIGNIFICANT CHANGE UP (ref 0–41)
ANION GAP SERPL CALC-SCNC: 12 MMOL/L — SIGNIFICANT CHANGE UP (ref 7–14)
AST SERPL-CCNC: 23 U/L — SIGNIFICANT CHANGE UP (ref 0–41)
BASOPHILS # BLD AUTO: 0.05 K/UL — SIGNIFICANT CHANGE UP (ref 0–0.2)
BASOPHILS NFR BLD AUTO: 0.7 % — SIGNIFICANT CHANGE UP (ref 0–1)
BILIRUB SERPL-MCNC: 0.4 MG/DL — SIGNIFICANT CHANGE UP (ref 0.2–1.2)
BUN SERPL-MCNC: 7 MG/DL — LOW (ref 10–20)
CALCIUM SERPL-MCNC: 9.3 MG/DL — SIGNIFICANT CHANGE UP (ref 8.5–10.1)
CHLORIDE SERPL-SCNC: 105 MMOL/L — SIGNIFICANT CHANGE UP (ref 98–110)
CO2 SERPL-SCNC: 23 MMOL/L — SIGNIFICANT CHANGE UP (ref 17–32)
CREAT SERPL-MCNC: 0.9 MG/DL — SIGNIFICANT CHANGE UP (ref 0.7–1.5)
EOSINOPHIL # BLD AUTO: 0.22 K/UL — SIGNIFICANT CHANGE UP (ref 0–0.7)
EOSINOPHIL NFR BLD AUTO: 3.3 % — SIGNIFICANT CHANGE UP (ref 0–8)
GLUCOSE SERPL-MCNC: 126 MG/DL — HIGH (ref 70–99)
HCT VFR BLD CALC: 39 % — SIGNIFICANT CHANGE UP (ref 37–47)
HGB BLD-MCNC: 12.6 G/DL — SIGNIFICANT CHANGE UP (ref 12–16)
IMM GRANULOCYTES NFR BLD AUTO: 0.3 % — SIGNIFICANT CHANGE UP (ref 0.1–0.3)
LYMPHOCYTES # BLD AUTO: 1.89 K/UL — SIGNIFICANT CHANGE UP (ref 1.2–3.4)
LYMPHOCYTES # BLD AUTO: 28.1 % — SIGNIFICANT CHANGE UP (ref 20.5–51.1)
MAGNESIUM SERPL-MCNC: 2 MG/DL — SIGNIFICANT CHANGE UP (ref 1.8–2.4)
MCHC RBC-ENTMCNC: 29.9 PG — SIGNIFICANT CHANGE UP (ref 27–31)
MCHC RBC-ENTMCNC: 32.3 G/DL — SIGNIFICANT CHANGE UP (ref 32–37)
MCV RBC AUTO: 92.6 FL — SIGNIFICANT CHANGE UP (ref 81–99)
MONOCYTES # BLD AUTO: 0.73 K/UL — HIGH (ref 0.1–0.6)
MONOCYTES NFR BLD AUTO: 10.9 % — HIGH (ref 1.7–9.3)
NEUTROPHILS # BLD AUTO: 3.81 K/UL — SIGNIFICANT CHANGE UP (ref 1.4–6.5)
NEUTROPHILS NFR BLD AUTO: 56.7 % — SIGNIFICANT CHANGE UP (ref 42.2–75.2)
NRBC # BLD: 0 /100 WBCS — SIGNIFICANT CHANGE UP (ref 0–0)
PLATELET # BLD AUTO: 186 K/UL — SIGNIFICANT CHANGE UP (ref 130–400)
POTASSIUM SERPL-MCNC: 4.3 MMOL/L — SIGNIFICANT CHANGE UP (ref 3.5–5)
POTASSIUM SERPL-SCNC: 4.3 MMOL/L — SIGNIFICANT CHANGE UP (ref 3.5–5)
PROT SERPL-MCNC: 6.4 G/DL — SIGNIFICANT CHANGE UP (ref 6–8)
RBC # BLD: 4.21 M/UL — SIGNIFICANT CHANGE UP (ref 4.2–5.4)
RBC # FLD: 14.2 % — SIGNIFICANT CHANGE UP (ref 11.5–14.5)
SARS-COV-2 RNA SPEC QL NAA+PROBE: SIGNIFICANT CHANGE UP
SODIUM SERPL-SCNC: 140 MMOL/L — SIGNIFICANT CHANGE UP (ref 135–146)
WBC # BLD: 6.72 K/UL — SIGNIFICANT CHANGE UP (ref 4.8–10.8)
WBC # FLD AUTO: 6.72 K/UL — SIGNIFICANT CHANGE UP (ref 4.8–10.8)

## 2021-04-13 PROCEDURE — 99233 SBSQ HOSP IP/OBS HIGH 50: CPT

## 2021-04-13 RX ADMIN — Medication 75 MICROGRAM(S): at 05:34

## 2021-04-13 RX ADMIN — Medication 100 MILLIGRAM(S): at 15:59

## 2021-04-13 RX ADMIN — Medication 60 MILLIGRAM(S): at 05:35

## 2021-04-13 RX ADMIN — SENNA PLUS 2 TABLET(S): 8.6 TABLET ORAL at 21:17

## 2021-04-13 RX ADMIN — LISINOPRIL 10 MILLIGRAM(S): 2.5 TABLET ORAL at 05:35

## 2021-04-13 RX ADMIN — CEFTRIAXONE 100 MILLIGRAM(S): 500 INJECTION, POWDER, FOR SOLUTION INTRAMUSCULAR; INTRAVENOUS at 01:02

## 2021-04-13 RX ADMIN — Medication 100 MILLIGRAM(S): at 05:35

## 2021-04-13 RX ADMIN — ONDANSETRON 4 MILLIGRAM(S): 8 TABLET, FILM COATED ORAL at 04:40

## 2021-04-13 RX ADMIN — Medication 100 MILLIGRAM(S): at 21:17

## 2021-04-13 RX ADMIN — ENOXAPARIN SODIUM 40 MILLIGRAM(S): 100 INJECTION SUBCUTANEOUS at 21:17

## 2021-04-13 NOTE — PROGRESS NOTE ADULT - SUBJECTIVE AND OBJECTIVE BOX
BLANCO LUI  88y  Female      Patient is a 88y old  Female who presents with a chief complaint of colitis (10 Apr 2021 15:22)      INTERVAL HPI/OVERNIGHT EVENTS:  She feels better today, yesterday she had pain with regular diet, placed on liquid diet.   Vital Signs Last 24 Hrs  T(C): 36.1 (2021 07:30), Max: 36.6 (10 Apr 2021 15:42)  T(F): 96.9 (2021 07:30), Max: 97.8 (10 Apr 2021 15:42)  HR: 59 (2021 07:30) (59 - 67)  BP: 164/74 (2021 07:30) (146/66 - 164/74)  BP(mean): --  RR: 18 (2021 07:30) (17 - 18)  SpO2: 98% (2021 07:30) (98% - 98%)      04-10-21 @ 07:01  -  21 @ 07:00  --------------------------------------------------------  IN: 1270 mL / OUT: 0 mL / NET: 1270 mL            Consultant(s) Notes Reviewed:  [x ] YES  [ ] NO          MEDICATIONS  (STANDING):  cefTRIAXone   IVPB 1000 milliGRAM(s) IV Intermittent every 24 hours  enoxaparin Injectable 40 milliGRAM(s) SubCutaneous at bedtime  lactated ringers. 1000 milliLiter(s) (50 mL/Hr) IV Continuous <Continuous>  metroNIDAZOLE  IVPB 500 milliGRAM(s) IV Intermittent every 8 hours  NIFEdipine XL 60 milliGRAM(s) Oral daily  polyethylene glycol 3350 17 Gram(s) Oral daily  senna 2 Tablet(s) Oral at bedtime    MEDICATIONS  (PRN):  meclizine 25 milliGRAM(s) Oral every 8 hours PRN Dizziness  ondansetron Injectable 4 milliGRAM(s) IV Push every 8 hours PRN Nausea and/or Vomiting      LABS                          11.2   7.29  )-----------( 208      ( 2021 07:30 )             34.5     04    139  |  106  |  9<L>  ----------------------------<  100<H>  3.8   |  24  |  1.2    Ca    9.3      2021 07:30  Mg     2.0         TPro  5.7<L>  /  Alb  3.6  /  TBili  0.4  /  DBili  x   /  AST  15  /  ALT  6   /  AlkPhos  69        Urinalysis Basic - ( 2021 14:10 )    Color: Light Yellow / Appearance: Clear / S.023 / pH: x  Gluc: x / Ketone: Negative  / Bili: Negative / Urobili: <2 mg/dL   Blood: x / Protein: 30 mg/dL / Nitrite: Positive   Leuk Esterase: Moderate / RBC: 1 /HPF / WBC 14 /HPF   Sq Epi: x / Non Sq Epi: 3 /HPF / Bacteria: Many        Lactate Trend  04-10 @ 05:53 Lactate:0.8    @ 11:29 Lactate:1.1     CARDIAC MARKERS ( 2021 14:10 )  x     / <0.01 ng/mL / x     / x     / x          CAPILLARY BLOOD GLUCOSE            RADIOLOGY & ADDITIONAL TESTS:    Imaging Personally Reviewed:  [ ] YES  [ ] NO    HEALTH ISSUES - PROBLEM Dx:          PHYSICAL EXAM:  GENERAL: NAD, well-developed.  HEAD:  Atraumatic, Normocephalic.  EYES: EOMI, PERRLA, conjunctiva and sclera clear.  NECK: Supple, No JVD.  CHEST/LUNG: Clear to auscultation bilaterally; No wheeze.  HEART: Regular rate and rhythm; S1 S2.   ABDOMEN: Soft, Nontender, Nondistended; Bowel sounds present.  EXTREMITIES:  2+ Peripheral Pulses, No clubbing, cyanosis, or edema.  PSYCH: AAOx3.  NEUROLOGY: non-focal.  SKIN: No rashes or lesions.
Patient is a 88y old  Female who presents with a chief complaint of colitis (12 Apr 2021 10:22)    Patient was seen and examined.  As per pt's nurse, pt with poor po intake  Pt denies any complaints  All systems reviewed positive history as mentioned above.    PAST MEDICAL & SURGICAL HISTORY:  HTN (hypertension)  Hypothyroid    Allergies  No Known Allergies    Home Medications:  benazepril 10 mg oral tablet: 1 tab(s) orally once a day (10 Apr 2021 11:14)  Nifedical XL 60 mg oral tablet, extended release: 1 tab(s) orally once a day (10 Apr 2021 11:14)    MEDICATIONS  (STANDING):  cefTRIAXone   IVPB 1000 milliGRAM(s) IV Intermittent every 24 hours  enoxaparin Injectable 40 milliGRAM(s) SubCutaneous at bedtime  lactated ringers. 1000 milliLiter(s) (50 mL/Hr) IV Continuous <Continuous>  lisinopril 10 milliGRAM(s) Oral daily  metroNIDAZOLE  IVPB 500 milliGRAM(s) IV Intermittent every 8 hours  NIFEdipine XL 60 milliGRAM(s) Oral daily  polyethylene glycol 3350 17 Gram(s) Oral daily  senna 2 Tablet(s) Oral at bedtime    MEDICATIONS  (PRN):  meclizine 25 milliGRAM(s) Oral every 8 hours PRN Dizziness  ondansetron Injectable 4 milliGRAM(s) IV Push every 8 hours PRN Nausea and/or Vomiting    Vital Signs Last 24 Hrs  T(C): 35.6  T(F): 96.1  HR: 62  BP: 176/76  BP(mean): --  RR: 18  SpO2: 95%    O/E:  Awake, alert, not in distress.  HEENT: atraumatic, EOMI.  Chest: clear.  CVS: SIS2 +, no murmur.  P/A: Soft, BS+  CNS: non focal.  Ext: no edema feet.  Skin: no rash, no ulcers.  All systems reviewed positive findings as above.                          11.2<L>  7.29  )-----------( 208      ( 11 Apr 2021 07:30 )             34.5<L>    04-11    139  |  106  |  9<L>  ----------------------------<  100<H>  3.8   |  24  |  1.2    Ca    9.3      11 Apr 2021 07:30  Mg     2.0     04-11    TPro  5.7<L>  /  Alb  3.6  /  TBili  0.4  /  DBili  x   /  AST  15  /  ALT  6   /  AlkPhos  69  04-11                      
  BLANCO LUI  88y  Female      Patient is a 88y old  Female who presents with a chief complaint of colitis (2021 22:47)      INTERVAL HPI/OVERNIGHT EVENTS:  She feels better, abdominal pain improved.   Vital Signs Last 24 Hrs  T(C): 36.9 (10 Apr 2021 08:23), Max: 37.1 (2021 22:26)  T(F): 98.4 (10 Apr 2021 08:23), Max: 98.7 (2021 22:26)  HR: 70 (10 Apr 2021 08:23) (70 - 95)  BP: 166/68 (10 Apr 2021 08:23) (145/60 - 166/68)  BP(mean): --  RR: 18 (10 Apr 2021 08:23) (18 - 18)  SpO2: --            Consultant(s) Notes Reviewed:  [x ] YES  [ ] NO          MEDICATIONS  (STANDING):  cefTRIAXone   IVPB 1000 milliGRAM(s) IV Intermittent every 24 hours  enoxaparin Injectable 40 milliGRAM(s) SubCutaneous at bedtime  lactated ringers. 1000 milliLiter(s) (50 mL/Hr) IV Continuous <Continuous>  metroNIDAZOLE  IVPB 500 milliGRAM(s) IV Intermittent every 8 hours  NIFEdipine XL 60 milliGRAM(s) Oral daily  polyethylene glycol 3350 17 Gram(s) Oral daily  senna 2 Tablet(s) Oral at bedtime    MEDICATIONS  (PRN):  meclizine 25 milliGRAM(s) Oral every 8 hours PRN Dizziness  ondansetron Injectable 4 milliGRAM(s) IV Push every 8 hours PRN Nausea and/or Vomiting      LABS                          11.9   11.47 )-----------( 231      ( 10 Apr 2021 05:53 )             37.1     04-10    142  |  107  |  10  ----------------------------<  103<H>  3.6   |  20  |  1.2    Ca    9.5      10 Apr 2021 05:53  Mg     2.0     04-10    TPro  6.6  /  Alb  4.3  /  TBili  0.6  /  DBili  x   /  AST  19  /  ALT  9   /  AlkPhos  84  04-10      Urinalysis Basic - ( 2021 14:10 )    Color: Light Yellow / Appearance: Clear / S.023 / pH: x  Gluc: x / Ketone: Negative  / Bili: Negative / Urobili: <2 mg/dL   Blood: x / Protein: 30 mg/dL / Nitrite: Positive   Leuk Esterase: Moderate / RBC: 1 /HPF / WBC 14 /HPF   Sq Epi: x / Non Sq Epi: 3 /HPF / Bacteria: Many        Lactate Trend  -10 @ 05:53 Lactate:0.8   - @ 11:29 Lactate:1.1     CARDIAC MARKERS ( 2021 14:10 )  x     / <0.01 ng/mL / x     / x     / x          CAPILLARY BLOOD GLUCOSE            RADIOLOGY & ADDITIONAL TESTS:    Imaging Personally Reviewed:  [ ] YES  [ ] NO    HEALTH ISSUES - PROBLEM Dx:          PHYSICAL EXAM:  GENERAL: NAD, well-developed.  HEAD:  Atraumatic, Normocephalic.  EYES: EOMI, PERRLA, conjunctiva and sclera clear.  NECK: Supple, No JVD.  CHEST/LUNG: Clear to auscultation bilaterally; No wheeze.  HEART: Regular rate and rhythm; S1 S2.   ABDOMEN: Soft, Nontender, Nondistended; Bowel sounds present.  EXTREMITIES:  2+ Peripheral Pulses, No clubbing, cyanosis, or edema.  PSYCH: AAOx3.  NEUROLOGY: non-focal.  SKIN: No rashes or lesions.
Patient is a 88y old  Female who presents with a chief complaint of colitis (12 Apr 2021 10:22)    Patient was seen and examined.  no new events    PAST MEDICAL & SURGICAL HISTORY:  HTN (hypertension)  Hypothyroid    Allergies  No Known Allergies    Home Medications:  benazepril 10 mg oral tablet: 1 tab(s) orally once a day (10 Apr 2021 11:14)  Nifedical XL 60 mg oral tablet, extended release: 1 tab(s) orally once a day (10 Apr 2021 11:14)    MEDICATIONS  (STANDING):  cefTRIAXone   IVPB 1000 milliGRAM(s) IV Intermittent every 24 hours  enoxaparin Injectable 40 milliGRAM(s) SubCutaneous at bedtime  lactated ringers. 1000 milliLiter(s) (50 mL/Hr) IV Continuous <Continuous>  levothyroxine 75 MICROGram(s) Oral daily  lisinopril 10 milliGRAM(s) Oral daily  metroNIDAZOLE  IVPB 500 milliGRAM(s) IV Intermittent every 8 hours  NIFEdipine XL 60 milliGRAM(s) Oral daily  polyethylene glycol 3350 17 Gram(s) Oral daily  senna 2 Tablet(s) Oral at bedtime    MEDICATIONS  (PRN):  meclizine 25 milliGRAM(s) Oral every 8 hours PRN Dizziness  ondansetron Injectable 4 milliGRAM(s) IV Push every 8 hours PRN Nausea and/or Vomiting    T(C): 35.7 (04-13-21 @ 07:35), Max: 36.1 (04-12-21 @ 15:45)  HR: 73 (04-13-21 @ 07:35) (63 - 73)  BP: 158/72 (04-13-21 @ 07:35) (158/70 - 173/84)  RR: 18 (04-13-21 @ 07:35) (18 - 18)  SpO2: --    O/E:  Awake, alert, not in distress.  HEENT: atraumatic, EOMI.  Chest: clear.  CVS: SIS2 +, no murmur.  P/A: Soft, BS+  CNS: non focal.  Ext: no edema feet.  Skin: no rash, no ulcers.  All systems reviewed positive findings as above.                                 12.6   6.72  )-----------( 186      ( 13 Apr 2021 06:51 )             39.0   04-13    140  |  105  |  7<L>  ----------------------------<  126<H>  4.3   |  23  |  0.9    Ca    9.3      13 Apr 2021 06:51  Mg     2.0     04-13    TPro  6.4  /  Alb  4.0  /  TBili  0.4  /  DBili  x   /  AST  23  /  ALT  9   /  AlkPhos  66  04-13

## 2021-04-13 NOTE — CHART NOTE - NSCHARTNOTEFT_GEN_A_CORE
Patient seen and examined @ the bedside today. Patient clinically stable at this time. No longer requires acute in hospital level of care. Can be continually followed/managed as an outpatient. Please see discharge summary for further information including today's vitals and physical exam. \    Labs:  CAPILLARY BLOOD GLUCOSE                              12.6   6.72  )-----------( 186      ( 13 Apr 2021 06:51 )             39.0       Auto Neutrophil %: 56.7 % (04-13-21 @ 06:51)  Auto Immature Granulocyte %: 0.3 % (04-13-21 @ 06:51)    04-13    140  |  105  |  7<L>  ----------------------------<  126<H>  4.3   |  23  |  0.9      Calcium, Total Serum: 9.3 mg/dL (04-13-21 @ 06:51)      LFTs:             6.4  | 0.4  | 23       ------------------[66      ( 13 Apr 2021 06:51 )  4.0  | x    | 9           Lipase:x      Amylase:x             Coags:

## 2021-04-13 NOTE — PROGRESS NOTE ADULT - ASSESSMENT
88 yr F with HTN and Vertigo and hypothyroidism presented to ER due to abd pain, nausea and vomiting.    # Acute Colitis  -  CT Abdomen and Pelvis w/ IV Cont (04.09.21 @ 12:58) >Apparent mural thickening involving the right colon and rectosigmoid. No significant surrounding inflammatory changes noted. Findings may be on the basis of underdistention with mild colitis not excluded.  - c/w ceftriaxone, flagyl    # UTI  - F/u urine culture  - pt on ceftriaxone    # Hypertension- uncontrolled  - c/w procardia, lisinopril    # Hypothyroidism  - Thoid Stimulating Hormone, Serum: 52.60 uIU/mL (04.10.21 @ 05:53)  - confirm home dose   -  restarted synthroid  - recheck TSH in 6 weeks    # CKD stage3 stable    # DVT prophylaxis    # Full code    # Pending: clinical improvement, F/u Covid PCR  # Discussed plan of care with patient  # Disposition: Home when stable      
88 yr F with HTN and Vertigo and hypothyroidism presented jag ER due to abd pain, nausea and vomiting    A/P:   Acute Colitis:   Abdominal pain  CT abdomen showed mild mural thickening involving the right colon and rectosigmoid. Could be colitis vs underdistention.   Due to abdominal pain will treat with antibiotics.   Continue Rocephin and Flagyl  Discontinue IV fluid.   Bowel regimen. Advance diet as tolerated.  UTI:   Continue Rocephin, unfortunately no urine cx sent.   HTN: Continue Nifedipine.     Vertigo; meclizine PRN    #Progress Note Handoff:  Pending (specify):  improving abdominal pain, tolerating po diet.   Family discussion: with patient.   Disposition: Home in 24 hrs. 
88 yr F with HTN and Vertigo and hypothyroidism presented jag ER due to abd pain, nausea and vomiting    A/P:   Acute Colitis:   Abdominal pain  CT abdomen showed mild mural thickening involving the right colon and rectosigmoid. Could be colitis vs underdistention.   Due to abdominal pain will treat with antibiotics.   Continue Rocephin and Flagyl  Discontinue IV fluid.   Bowel regimen.     UTI:   Continue Rocephin, unfortunately no urine cx sent.   HTN: Continue Nifedipine.     Vertigo; meclizine PRN    #Progress Note Handoff:  Pending (specify):  improving abdominal pain   Family discussion: with patient.   Disposition: Home in 24 hrs.   
88 yr F with HTN and Vertigo and hypothyroidism presented to ER due to abd pain, nausea and vomiting.    # Acute Colitis  -  CT Abdomen and Pelvis w/ IV Cont (04.09.21 @ 12:58) >Apparent mural thickening involving the right colon and rectosigmoid. No significant surrounding inflammatory changes noted. Findings may be on the basis of underdistention with mild colitis not excluded.  - c/w ceftriaxone, flagyl    # UTI  - F/u urine culture  - pt ob ceftriaxone    # Hypertension- uncontrolled  - c/w procardia  - restart lisinopril    # Hypothyroidism  - Thoid Stimulating Hormone, Serum: 52.60 uIU/mL (04.10.21 @ 05:53)  - confirm home dose   - Start synthroid    # CKD stage3 stable    # DVT prophylaxis    # Full code    # Pending: clinical improvement  # Discussed plan of care with patient  # Disposition: Home when stable

## 2021-04-14 ENCOUNTER — TRANSCRIPTION ENCOUNTER (OUTPATIENT)
Age: 86
End: 2021-04-14

## 2021-04-14 VITALS — SYSTOLIC BLOOD PRESSURE: 130 MMHG | DIASTOLIC BLOOD PRESSURE: 80 MMHG

## 2021-04-14 LAB
ALBUMIN SERPL ELPH-MCNC: 4.1 G/DL — SIGNIFICANT CHANGE UP (ref 3.5–5.2)
ALP SERPL-CCNC: 65 U/L — SIGNIFICANT CHANGE UP (ref 30–115)
ALT FLD-CCNC: 11 U/L — SIGNIFICANT CHANGE UP (ref 0–41)
ANION GAP SERPL CALC-SCNC: 14 MMOL/L — SIGNIFICANT CHANGE UP (ref 7–14)
AST SERPL-CCNC: 23 U/L — SIGNIFICANT CHANGE UP (ref 0–41)
BASOPHILS # BLD AUTO: 0.04 K/UL — SIGNIFICANT CHANGE UP (ref 0–0.2)
BASOPHILS NFR BLD AUTO: 0.5 % — SIGNIFICANT CHANGE UP (ref 0–1)
BILIRUB SERPL-MCNC: 0.5 MG/DL — SIGNIFICANT CHANGE UP (ref 0.2–1.2)
BUN SERPL-MCNC: 7 MG/DL — LOW (ref 10–20)
CALCIUM SERPL-MCNC: 9.5 MG/DL — SIGNIFICANT CHANGE UP (ref 8.5–10.1)
CHLORIDE SERPL-SCNC: 105 MMOL/L — SIGNIFICANT CHANGE UP (ref 98–110)
CO2 SERPL-SCNC: 23 MMOL/L — SIGNIFICANT CHANGE UP (ref 17–32)
CREAT SERPL-MCNC: 1.1 MG/DL — SIGNIFICANT CHANGE UP (ref 0.7–1.5)
EOSINOPHIL # BLD AUTO: 0.2 K/UL — SIGNIFICANT CHANGE UP (ref 0–0.7)
EOSINOPHIL NFR BLD AUTO: 2.6 % — SIGNIFICANT CHANGE UP (ref 0–8)
GLUCOSE SERPL-MCNC: 171 MG/DL — HIGH (ref 70–99)
HCT VFR BLD CALC: 38.6 % — SIGNIFICANT CHANGE UP (ref 37–47)
HGB BLD-MCNC: 12.4 G/DL — SIGNIFICANT CHANGE UP (ref 12–16)
IMM GRANULOCYTES NFR BLD AUTO: 0.3 % — SIGNIFICANT CHANGE UP (ref 0.1–0.3)
LYMPHOCYTES # BLD AUTO: 2.34 K/UL — SIGNIFICANT CHANGE UP (ref 1.2–3.4)
LYMPHOCYTES # BLD AUTO: 31 % — SIGNIFICANT CHANGE UP (ref 20.5–51.1)
MAGNESIUM SERPL-MCNC: 2 MG/DL — SIGNIFICANT CHANGE UP (ref 1.8–2.4)
MCHC RBC-ENTMCNC: 30.1 PG — SIGNIFICANT CHANGE UP (ref 27–31)
MCHC RBC-ENTMCNC: 32.1 G/DL — SIGNIFICANT CHANGE UP (ref 32–37)
MCV RBC AUTO: 93.7 FL — SIGNIFICANT CHANGE UP (ref 81–99)
MONOCYTES # BLD AUTO: 0.81 K/UL — HIGH (ref 0.1–0.6)
MONOCYTES NFR BLD AUTO: 10.7 % — HIGH (ref 1.7–9.3)
NEUTROPHILS # BLD AUTO: 4.15 K/UL — SIGNIFICANT CHANGE UP (ref 1.4–6.5)
NEUTROPHILS NFR BLD AUTO: 54.9 % — SIGNIFICANT CHANGE UP (ref 42.2–75.2)
NRBC # BLD: 0 /100 WBCS — SIGNIFICANT CHANGE UP (ref 0–0)
PLATELET # BLD AUTO: 207 K/UL — SIGNIFICANT CHANGE UP (ref 130–400)
POTASSIUM SERPL-MCNC: 3.8 MMOL/L — SIGNIFICANT CHANGE UP (ref 3.5–5)
POTASSIUM SERPL-SCNC: 3.8 MMOL/L — SIGNIFICANT CHANGE UP (ref 3.5–5)
PROT SERPL-MCNC: 6.5 G/DL — SIGNIFICANT CHANGE UP (ref 6–8)
RBC # BLD: 4.12 M/UL — LOW (ref 4.2–5.4)
RBC # FLD: 14.3 % — SIGNIFICANT CHANGE UP (ref 11.5–14.5)
SODIUM SERPL-SCNC: 142 MMOL/L — SIGNIFICANT CHANGE UP (ref 135–146)
WBC # BLD: 7.56 K/UL — SIGNIFICANT CHANGE UP (ref 4.8–10.8)
WBC # FLD AUTO: 7.56 K/UL — SIGNIFICANT CHANGE UP (ref 4.8–10.8)

## 2021-04-14 PROCEDURE — 99239 HOSP IP/OBS DSCHRG MGMT >30: CPT

## 2021-04-14 RX ORDER — PANTOPRAZOLE SODIUM 20 MG/1
1 TABLET, DELAYED RELEASE ORAL
Qty: 30 | Refills: 0
Start: 2021-04-14 | End: 2021-05-13

## 2021-04-14 RX ORDER — PANTOPRAZOLE SODIUM 20 MG/1
40 TABLET, DELAYED RELEASE ORAL ONCE
Refills: 0 | Status: COMPLETED | OUTPATIENT
Start: 2021-04-14 | End: 2021-04-14

## 2021-04-14 RX ORDER — ATORVASTATIN CALCIUM 80 MG/1
0 TABLET, FILM COATED ORAL
Qty: 0 | Refills: 3 | DISCHARGE

## 2021-04-14 RX ORDER — LEVOTHYROXINE SODIUM 125 MCG
0 TABLET ORAL
Qty: 0 | Refills: 2 | DISCHARGE

## 2021-04-14 RX ADMIN — POLYETHYLENE GLYCOL 3350 17 GRAM(S): 17 POWDER, FOR SOLUTION ORAL at 11:10

## 2021-04-14 RX ADMIN — SODIUM CHLORIDE 50 MILLILITER(S): 9 INJECTION, SOLUTION INTRAVENOUS at 05:40

## 2021-04-14 RX ADMIN — Medication 60 MILLIGRAM(S): at 05:42

## 2021-04-14 RX ADMIN — Medication 30 MILLILITER(S): at 11:10

## 2021-04-14 RX ADMIN — CEFTRIAXONE 100 MILLIGRAM(S): 500 INJECTION, POWDER, FOR SOLUTION INTRAMUSCULAR; INTRAVENOUS at 02:38

## 2021-04-14 RX ADMIN — LISINOPRIL 10 MILLIGRAM(S): 2.5 TABLET ORAL at 05:42

## 2021-04-14 RX ADMIN — Medication 100 MILLIGRAM(S): at 05:41

## 2021-04-14 RX ADMIN — PANTOPRAZOLE SODIUM 40 MILLIGRAM(S): 20 TABLET, DELAYED RELEASE ORAL at 11:10

## 2021-04-14 RX ADMIN — Medication 75 MICROGRAM(S): at 05:42

## 2021-04-14 NOTE — CHART NOTE - NSCHARTNOTEFT_GEN_A_CORE
<<<RESIDENT DISCHARGE NOTE>>>     BLANCO LUI  MRN-[id number]    VITAL SIGNS:  T(F): 96 (04-14-21 @ 08:08), Max: 98 (04-14-21 @ 00:00)  HR: 78 (04-14-21 @ 08:08)  BP: 130/80 (04-14-21 @ 08:15)  SpO2: --      PHYSICAL EXAMINATION:  GENERAL: NAD, well-developed  HEENT:  Atraumatic, Normocephalic; EOMI, PERRLA, conjunctiva pink, sclera white, Supple, No JVD, thyromegaly   CHEST/LUNG: Clear to auscultation bilaterally; No wheeze, ronchi or rales  HEART: Regular rate and rhythm; No murmurs, rubs, or gallops  ABDOMEN: Soft, Mild epigastric tenderness, Nondistended; Bowel sounds present  EXTREMITIES:  2+ Peripheral Pulses, No peripheral pitting edema  PSYCH: AAOx3  NEUROLOGY: non-focal  SKIN: No rashes/lesions appreciated    TEST RESULTS:                        12.4   7.56  )-----------( 207      ( 14 Apr 2021 08:56 )             38.6       04-14    142  |  105  |  7<L>  ----------------------------<  171<H>  3.8   |  23  |  1.1    Ca    9.5      14 Apr 2021 08:56  Mg     2.0     04-14    TPro  6.5  /  Alb  4.1  /  TBili  0.5  /  DBili  x   /  AST  23  /  ALT  11  /  AlkPhos  65  04-14      FINAL DISCHARGE INTERVIEW:  Resident(s) Present: (Name:Dr. Elizalde),     DISCHARGE MEDICATION RECONCILIATION  reviewed with Attending (Name:Dr. Franz)    DISPOSITION:   [  ] Home,    [  ] Home with Visiting Nursing Services,   [ X   ]  SNF/ NH,    [   ] Acute Rehab (4A),   [   ] Other (Specify:_________)

## 2021-04-14 NOTE — PROVIDER CONTACT NOTE (OTHER) - ACTION/TREATMENT ORDERED:
Ok, I will change the diet back to clear liquids. No further interventions at this time.
provider aware that there is no IV and stated pt will be D/C'ed no need for new IV.

## 2021-04-14 NOTE — PROVIDER CONTACT NOTE (OTHER) - SITUATION
pt has no IV access and if to be receiving LR@50cc/hr. RN attempted to place new IV without success.
pt endorses pain w/ swallowing, points to sub sternal area. DX: Colitis

## 2021-04-14 NOTE — DISCHARGE NOTE NURSING/CASE MANAGEMENT/SOCIAL WORK - PATIENT PORTAL LINK FT
You can access the FollowMyHealth Patient Portal offered by [facility] by registering at the following website: [email or web address]. By joining [employer]’s FollowMyHealth portal, you will also be able to view your health information using other applications (apps) compatible with our system.

## 2021-04-20 DIAGNOSIS — R10.9 UNSPECIFIED ABDOMINAL PAIN: ICD-10-CM

## 2021-04-20 DIAGNOSIS — Z87.891 PERSONAL HISTORY OF NICOTINE DEPENDENCE: ICD-10-CM

## 2021-04-20 DIAGNOSIS — N39.0 URINARY TRACT INFECTION, SITE NOT SPECIFIED: ICD-10-CM

## 2021-04-20 DIAGNOSIS — K52.9 NONINFECTIVE GASTROENTERITIS AND COLITIS, UNSPECIFIED: ICD-10-CM

## 2021-04-20 DIAGNOSIS — R42 DIZZINESS AND GIDDINESS: ICD-10-CM

## 2021-04-20 DIAGNOSIS — N18.30 CHRONIC KIDNEY DISEASE, STAGE 3 UNSPECIFIED: ICD-10-CM

## 2021-04-20 DIAGNOSIS — E03.9 HYPOTHYROIDISM, UNSPECIFIED: ICD-10-CM

## 2021-04-20 DIAGNOSIS — I12.9 HYPERTENSIVE CHRONIC KIDNEY DISEASE WITH STAGE 1 THROUGH STAGE 4 CHRONIC KIDNEY DISEASE, OR UNSPECIFIED CHRONIC KIDNEY DISEASE: ICD-10-CM

## 2021-07-17 ENCOUNTER — EMERGENCY (EMERGENCY)
Facility: HOSPITAL | Age: 86
LOS: 0 days | Discharge: HOME | End: 2021-07-17
Attending: STUDENT IN AN ORGANIZED HEALTH CARE EDUCATION/TRAINING PROGRAM | Admitting: STUDENT IN AN ORGANIZED HEALTH CARE EDUCATION/TRAINING PROGRAM
Payer: MEDICARE

## 2021-07-17 VITALS
HEART RATE: 77 BPM | SYSTOLIC BLOOD PRESSURE: 149 MMHG | OXYGEN SATURATION: 99 % | TEMPERATURE: 98 F | DIASTOLIC BLOOD PRESSURE: 69 MMHG | RESPIRATION RATE: 17 BRPM

## 2021-07-17 DIAGNOSIS — Z79.899 OTHER LONG TERM (CURRENT) DRUG THERAPY: ICD-10-CM

## 2021-07-17 DIAGNOSIS — M25.522 PAIN IN LEFT ELBOW: ICD-10-CM

## 2021-07-17 DIAGNOSIS — M79.602 PAIN IN LEFT ARM: ICD-10-CM

## 2021-07-17 DIAGNOSIS — Z79.890 HORMONE REPLACEMENT THERAPY: ICD-10-CM

## 2021-07-17 DIAGNOSIS — E03.9 HYPOTHYROIDISM, UNSPECIFIED: ICD-10-CM

## 2021-07-17 DIAGNOSIS — I10 ESSENTIAL (PRIMARY) HYPERTENSION: ICD-10-CM

## 2021-07-17 LAB
ALBUMIN SERPL ELPH-MCNC: 4.3 G/DL — SIGNIFICANT CHANGE UP (ref 3.5–5.2)
ALP SERPL-CCNC: 86 U/L — SIGNIFICANT CHANGE UP (ref 30–115)
ALT FLD-CCNC: 14 U/L — SIGNIFICANT CHANGE UP (ref 0–41)
ANION GAP SERPL CALC-SCNC: 12 MMOL/L — SIGNIFICANT CHANGE UP (ref 7–14)
AST SERPL-CCNC: 21 U/L — SIGNIFICANT CHANGE UP (ref 0–41)
BASOPHILS # BLD AUTO: 0.03 K/UL — SIGNIFICANT CHANGE UP (ref 0–0.2)
BASOPHILS NFR BLD AUTO: 0.4 % — SIGNIFICANT CHANGE UP (ref 0–1)
BILIRUB SERPL-MCNC: 0.6 MG/DL — SIGNIFICANT CHANGE UP (ref 0.2–1.2)
BUN SERPL-MCNC: 11 MG/DL — SIGNIFICANT CHANGE UP (ref 10–20)
CALCIUM SERPL-MCNC: 9.6 MG/DL — SIGNIFICANT CHANGE UP (ref 8.5–10.1)
CHLORIDE SERPL-SCNC: 106 MMOL/L — SIGNIFICANT CHANGE UP (ref 98–110)
CO2 SERPL-SCNC: 23 MMOL/L — SIGNIFICANT CHANGE UP (ref 17–32)
CREAT SERPL-MCNC: 0.9 MG/DL — SIGNIFICANT CHANGE UP (ref 0.7–1.5)
EOSINOPHIL # BLD AUTO: 0.37 K/UL — SIGNIFICANT CHANGE UP (ref 0–0.7)
EOSINOPHIL NFR BLD AUTO: 4.6 % — SIGNIFICANT CHANGE UP (ref 0–8)
GLUCOSE SERPL-MCNC: 124 MG/DL — HIGH (ref 70–99)
HCT VFR BLD CALC: 32.6 % — LOW (ref 37–47)
HGB BLD-MCNC: 10.5 G/DL — LOW (ref 12–16)
IMM GRANULOCYTES NFR BLD AUTO: 0.2 % — SIGNIFICANT CHANGE UP (ref 0.1–0.3)
LYMPHOCYTES # BLD AUTO: 2.48 K/UL — SIGNIFICANT CHANGE UP (ref 1.2–3.4)
LYMPHOCYTES # BLD AUTO: 30.5 % — SIGNIFICANT CHANGE UP (ref 20.5–51.1)
MCHC RBC-ENTMCNC: 29.2 PG — SIGNIFICANT CHANGE UP (ref 27–31)
MCHC RBC-ENTMCNC: 32.2 G/DL — SIGNIFICANT CHANGE UP (ref 32–37)
MCV RBC AUTO: 90.6 FL — SIGNIFICANT CHANGE UP (ref 81–99)
MONOCYTES # BLD AUTO: 0.96 K/UL — HIGH (ref 0.1–0.6)
MONOCYTES NFR BLD AUTO: 11.8 % — HIGH (ref 1.7–9.3)
NEUTROPHILS # BLD AUTO: 4.27 K/UL — SIGNIFICANT CHANGE UP (ref 1.4–6.5)
NEUTROPHILS NFR BLD AUTO: 52.5 % — SIGNIFICANT CHANGE UP (ref 42.2–75.2)
NRBC # BLD: 0 /100 WBCS — SIGNIFICANT CHANGE UP (ref 0–0)
PLATELET # BLD AUTO: 184 K/UL — SIGNIFICANT CHANGE UP (ref 130–400)
POTASSIUM SERPL-MCNC: 4.4 MMOL/L — SIGNIFICANT CHANGE UP (ref 3.5–5)
POTASSIUM SERPL-SCNC: 4.4 MMOL/L — SIGNIFICANT CHANGE UP (ref 3.5–5)
PROT SERPL-MCNC: 6.6 G/DL — SIGNIFICANT CHANGE UP (ref 6–8)
RBC # BLD: 3.6 M/UL — LOW (ref 4.2–5.4)
RBC # FLD: 13.2 % — SIGNIFICANT CHANGE UP (ref 11.5–14.5)
SODIUM SERPL-SCNC: 141 MMOL/L — SIGNIFICANT CHANGE UP (ref 135–146)
TROPONIN T SERPL-MCNC: <0.01 NG/ML — SIGNIFICANT CHANGE UP
WBC # BLD: 8.13 K/UL — SIGNIFICANT CHANGE UP (ref 4.8–10.8)
WBC # FLD AUTO: 8.13 K/UL — SIGNIFICANT CHANGE UP (ref 4.8–10.8)

## 2021-07-17 PROCEDURE — 73080 X-RAY EXAM OF ELBOW: CPT | Mod: 26,LT

## 2021-07-17 PROCEDURE — 71045 X-RAY EXAM CHEST 1 VIEW: CPT | Mod: 26

## 2021-07-17 PROCEDURE — 93010 ELECTROCARDIOGRAM REPORT: CPT

## 2021-07-17 PROCEDURE — 99285 EMERGENCY DEPT VISIT HI MDM: CPT

## 2021-07-17 NOTE — ED PROVIDER NOTE - PHYSICAL EXAMINATION
Physical Exam    Vital Signs: I have reviewed the initial vital signs.  Constitutional: well-nourished, appears stated age, no acute distress  Eyes: Conjunctiva pink, Sclera clear  Cardiovascular: S1 and S2, regular rate, regular rhythm, well-perfused extremities, radial pulses equal and 2+ b/l.   Respiratory: unlabored respiratory effort, clear to auscultation bilaterally no wheezing, rales and rhonchi. pt is speaking full sentences. no accessory muscle use.   Gastrointestinal: soft, non-tender, nondistended abdomen, no pulsatile mass, normal bowl sounds, no rebound, no guarding, no organomegaly.   Musculoskeletal: supple neck, no lower extremity edema, no calf tenderness, no midline tenderness, no palpable spinal step offs. (+) tenderness over the central side of the lateral aspect of the left elbow without erythema or edema and pain to the area with flexion of the left arm.   Integumentary: warm, dry, no rash. no abrasions, lacerations or ecchymosis.   Neurologic: awake, alert, cranial nerves II-XII grossly intact, median, radial, and ulnar nerve motor and sensory function grossly intact b/l.   Psychiatric: appropriate mood, appropriate affect

## 2021-07-17 NOTE — ED PROVIDER NOTE - NSFOLLOWUPINSTRUCTIONS_ED_ALL_ED_FT
Arm Pain    WHAT YOU NEED TO KNOW:    Your arm pain may be caused by a number of conditions. Examples include arthritis, nerve problems, or an awkward position while you sleep. X-rays did not show a broken bone in your arm or wrist. Arm pain may be a sign of a serious condition that needs immediate care, such as a heart attack.    DISCHARGE INSTRUCTIONS:    Call 911 for any of the following: You have any of the following signs of a heart attack:     Squeezing, pressure, or pain in your chest that lasts longer than 5 minutes or returns      Discomfort or pain in your back, neck, jaw, stomach, or arm       Trouble breathing or a fast, fluttery heartbeat      Nausea or vomiting      Lightheadedness or a sudden cold sweat, especially with chest pain or trouble breathing    Return to the emergency department if:     You have severe pain, or pain that spreads from your arm to other areas.      You have swelling, tingling, or numbness in your hand or fingers, or the skin turns blue.      You cannot move your arm.    Contact your healthcare provider if:     You have questions or concerns about your condition or care.        Medicines: You may need any of the following:     Prescription pain medicine may be given. Ask how to take this medicine safely.      NSAIDs, such as ibuprofen, help decrease swelling, pain, and fever. This medicine is available with or without a doctor's order. NSAIDs can cause stomach bleeding or kidney problems in certain people. If you take blood thinner medicine, always ask your healthcare provider if NSAIDs are safe for you. Always read the medicine label and follow directions.      Take your medicine as directed. Contact your healthcare provider if you think your medicine is not helping or if you have side effects. Tell him or her if you are allergic to any medicine. Keep a list of the medicines, vitamins, and herbs you take. Include the amounts, and when and why you take them. Bring the list or the pill bottles to follow-up visits. Carry your medicine list with you in case of an emergency.    Self-care:     Rest your arm as directed. A sling may be used to keep your arm from moving while it heals.      Apply ice as directed. Ice helps decrease pain and swelling. Ice may also help prevent tissue damage. Use an ice pack, or put crushed ice in a plastic bag. Cover it with a towel. Apply it to your arm for 20 minutes every few hours, or as directed. Ask how many times to apply ice each day, and for how many days.      Elevate your arm above the level of your heart as often as you can. This will help decrease swelling and pain. Prop your arm on pillows or blankets to keep the area elevated comfortably.      Adjust your position if you work in front of a computer. You may need arm or wrist supports or change the height of your chair.       Keep a pain record. Write down when your pain happens and how severe it is. Include any other symptoms you have with your pain. A record will help you keep track of pain cycles. Bring the record with you to your follow-up visits. It may also help your healthcare provider find out what is causing your pain.    Follow up with your healthcare provider as directed: You may need physical therapy. You may need to see an orthopedic specialist. Write down your questions so you remember to ask them during your visits.       © Copyright [employer] 2019 All illustrations and images included in CareNotes are the copyrighted property of [employer] or unta[employer].

## 2021-07-17 NOTE — ED ADULT NURSE NOTE - OBJECTIVE STATEMENT
Pt sent in by urgent care for an abnormal EKG; pt's only complaint is non-traumatic left arm pain x1 week. Denies injury/trauma, denies chest pain.

## 2021-07-17 NOTE — ED PROVIDER NOTE - OBJECTIVE STATEMENT
87 y/o female with a PMH of HTN and hypothyroidism presents to the ED for evaluation of left arm pain near her elbow that began this week. pt reports she was seen by [facility] and was told to visit the ED for evaluation of abnormal EKG. pt denies chest pain, sob, back pain, neck pain, jaw pain, sweating, palpitations, dizziness, abdominal pain, n/v/d/c, hx of mi or blood clot, cigarette smoking, use of alcohol, illicit drug use, fever, chills, cough, or recent trauma.

## 2021-07-17 NOTE — ED PROVIDER NOTE - NS ED ROS FT
CONST: No fever, chills or bodyaches  EYES: No pain, redness, drainage or visual changes.  ENT: No ear pain or discharge, nasal discharge or congestion. No sore throat  CARD: No chest pain, palpitations  RESP: No SOB, cough, hemoptysis. No hx of asthma or COPD  GI: No abdominal pain, N/V/D  : No urinary symptoms  MS: No joint pain, back pain. (+) left arm pain  SKIN: No rashes  NEURO: No headache, dizziness, paresthesias or LOC

## 2021-07-17 NOTE — ED PROVIDER NOTE - CLINICAL SUMMARY MEDICAL DECISION MAKING FREE TEXT BOX
arm pain likely msk, no e/o fx on xr. acs r/o w/ labs/ekg. discussed risks of immobilization w/ pt and family and recommended ortho f/u

## 2021-07-17 NOTE — ED PROVIDER NOTE - ATTENDING CONTRIBUTION TO CARE
89 yo f hx htn, hypothyroid  pt presents for eval of L lateral elbow pain on flexion/extension.  no known trauma. no chest pain or shortness of breath.  pt went to UC and was ref to er because of EKG.    vss  gen- NAD, aaox3  card-rrr  lungs-ctab, no wheezing or rhonchi  LUE- mild tenderness on palpation to L lateral elbow, reproducible pain on full flexion    likely msk pain, will r/o acs w/ ekg/trop x1 given age, extremity film

## 2021-09-02 NOTE — ED PROVIDER NOTE - PATIENT PORTAL LINK FT
never
You can access the FollowMyHealth Patient Portal offered by [facility] by registering at the following website: [email or web address]. By joining [employer]’s FollowMyHealth portal, you will also be able to view your health information using other applications (apps) compatible with our system.

## 2021-10-12 ENCOUNTER — TRANSCRIPTION ENCOUNTER (OUTPATIENT)
Age: 86
End: 2021-10-12

## 2022-04-19 ENCOUNTER — INPATIENT (INPATIENT)
Facility: HOSPITAL | Age: 87
LOS: 3 days | Discharge: HOME HEALTH | End: 2022-04-23
Attending: FAMILY MEDICINE | Admitting: FAMILY MEDICINE
Payer: MEDICARE

## 2022-04-19 VITALS
OXYGEN SATURATION: 98 % | TEMPERATURE: 97 F | HEART RATE: 115 BPM | DIASTOLIC BLOOD PRESSURE: 63 MMHG | SYSTOLIC BLOOD PRESSURE: 147 MMHG | RESPIRATION RATE: 18 BRPM | HEIGHT: 60 IN | WEIGHT: 149.91 LBS

## 2022-04-19 DIAGNOSIS — L30.9 DERMATITIS, UNSPECIFIED: ICD-10-CM

## 2022-04-19 DIAGNOSIS — I48.0 PAROXYSMAL ATRIAL FIBRILLATION: ICD-10-CM

## 2022-04-19 DIAGNOSIS — L89.151 PRESSURE ULCER OF SACRAL REGION, STAGE 1: ICD-10-CM

## 2022-04-19 DIAGNOSIS — E78.2 MIXED HYPERLIPIDEMIA: ICD-10-CM

## 2022-04-19 DIAGNOSIS — Z79.01 LONG TERM (CURRENT) USE OF ANTICOAGULANTS: ICD-10-CM

## 2022-04-19 DIAGNOSIS — D64.9 ANEMIA, UNSPECIFIED: ICD-10-CM

## 2022-04-19 DIAGNOSIS — K20.91 ESOPHAGITIS, UNSPECIFIED WITH BLEEDING: ICD-10-CM

## 2022-04-19 DIAGNOSIS — R53.81 OTHER MALAISE: ICD-10-CM

## 2022-04-19 DIAGNOSIS — R29.6 REPEATED FALLS: ICD-10-CM

## 2022-04-19 DIAGNOSIS — F03.90 UNSPECIFIED DEMENTIA WITHOUT BEHAVIORAL DISTURBANCE: ICD-10-CM

## 2022-04-19 DIAGNOSIS — K29.70 GASTRITIS, UNSPECIFIED, WITHOUT BLEEDING: ICD-10-CM

## 2022-04-19 DIAGNOSIS — K29.80 DUODENITIS WITHOUT BLEEDING: ICD-10-CM

## 2022-04-19 DIAGNOSIS — Z20.822 CONTACT WITH AND (SUSPECTED) EXPOSURE TO COVID-19: ICD-10-CM

## 2022-04-19 DIAGNOSIS — I10 ESSENTIAL (PRIMARY) HYPERTENSION: ICD-10-CM

## 2022-04-19 DIAGNOSIS — E03.9 HYPOTHYROIDISM, UNSPECIFIED: ICD-10-CM

## 2022-04-19 LAB
ALBUMIN SERPL ELPH-MCNC: 4.9 G/DL — SIGNIFICANT CHANGE UP (ref 3.5–5.2)
ALP SERPL-CCNC: 82 U/L — SIGNIFICANT CHANGE UP (ref 30–115)
ALT FLD-CCNC: 20 U/L — SIGNIFICANT CHANGE UP (ref 0–41)
AMYLASE P1 CFR SERPL: 97 U/L — SIGNIFICANT CHANGE UP (ref 25–115)
ANION GAP SERPL CALC-SCNC: 14 MMOL/L — SIGNIFICANT CHANGE UP (ref 7–14)
AST SERPL-CCNC: 29 U/L — SIGNIFICANT CHANGE UP (ref 0–41)
BASOPHILS # BLD AUTO: 0.03 K/UL — SIGNIFICANT CHANGE UP (ref 0–0.2)
BASOPHILS NFR BLD AUTO: 0.3 % — SIGNIFICANT CHANGE UP (ref 0–1)
BILIRUB SERPL-MCNC: 0.8 MG/DL — SIGNIFICANT CHANGE UP (ref 0.2–1.2)
BLD GP AB SCN SERPL QL: SIGNIFICANT CHANGE UP
BUN SERPL-MCNC: 30 MG/DL — HIGH (ref 10–20)
CALCIUM SERPL-MCNC: 10.7 MG/DL — HIGH (ref 8.5–10.1)
CHLORIDE SERPL-SCNC: 101 MMOL/L — SIGNIFICANT CHANGE UP (ref 98–110)
CK MB CFR SERPL CALC: 2.9 NG/ML — SIGNIFICANT CHANGE UP (ref 0.6–6.3)
CK SERPL-CCNC: 53 U/L — SIGNIFICANT CHANGE UP (ref 0–225)
CO2 SERPL-SCNC: 23 MMOL/L — SIGNIFICANT CHANGE UP (ref 17–32)
CREAT SERPL-MCNC: 1.3 MG/DL — SIGNIFICANT CHANGE UP (ref 0.7–1.5)
EGFR: 39 ML/MIN/1.73M2 — LOW
EOSINOPHIL # BLD AUTO: 0 K/UL — SIGNIFICANT CHANGE UP (ref 0–0.7)
EOSINOPHIL NFR BLD AUTO: 0 % — SIGNIFICANT CHANGE UP (ref 0–8)
GLUCOSE SERPL-MCNC: 221 MG/DL — HIGH (ref 70–99)
HCT VFR BLD CALC: 27.9 % — LOW (ref 37–47)
HCT VFR BLD CALC: 34.6 % — LOW (ref 37–47)
HGB BLD-MCNC: 11 G/DL — LOW (ref 12–16)
HGB BLD-MCNC: 9 G/DL — LOW (ref 12–16)
IMM GRANULOCYTES NFR BLD AUTO: 0.3 % — SIGNIFICANT CHANGE UP (ref 0.1–0.3)
LIDOCAIN IGE QN: 62 U/L — HIGH (ref 7–60)
LYMPHOCYTES # BLD AUTO: 1.86 K/UL — SIGNIFICANT CHANGE UP (ref 1.2–3.4)
LYMPHOCYTES # BLD AUTO: 16 % — LOW (ref 20.5–51.1)
MCHC RBC-ENTMCNC: 29 PG — SIGNIFICANT CHANGE UP (ref 27–31)
MCHC RBC-ENTMCNC: 29.1 PG — SIGNIFICANT CHANGE UP (ref 27–31)
MCHC RBC-ENTMCNC: 31.8 G/DL — LOW (ref 32–37)
MCHC RBC-ENTMCNC: 32.3 G/DL — SIGNIFICANT CHANGE UP (ref 32–37)
MCV RBC AUTO: 90.3 FL — SIGNIFICANT CHANGE UP (ref 81–99)
MCV RBC AUTO: 91.3 FL — SIGNIFICANT CHANGE UP (ref 81–99)
MONOCYTES # BLD AUTO: 0.67 K/UL — HIGH (ref 0.1–0.6)
MONOCYTES NFR BLD AUTO: 5.8 % — SIGNIFICANT CHANGE UP (ref 1.7–9.3)
NEUTROPHILS # BLD AUTO: 9.04 K/UL — HIGH (ref 1.4–6.5)
NEUTROPHILS NFR BLD AUTO: 77.6 % — HIGH (ref 42.2–75.2)
NRBC # BLD: 0 /100 WBCS — SIGNIFICANT CHANGE UP (ref 0–0)
NRBC # BLD: 0 /100 WBCS — SIGNIFICANT CHANGE UP (ref 0–0)
PLATELET # BLD AUTO: 210 K/UL — SIGNIFICANT CHANGE UP (ref 130–400)
PLATELET # BLD AUTO: 252 K/UL — SIGNIFICANT CHANGE UP (ref 130–400)
POTASSIUM SERPL-MCNC: 5 MMOL/L — SIGNIFICANT CHANGE UP (ref 3.5–5)
POTASSIUM SERPL-SCNC: 5 MMOL/L — SIGNIFICANT CHANGE UP (ref 3.5–5)
PROT SERPL-MCNC: 7.4 G/DL — SIGNIFICANT CHANGE UP (ref 6–8)
RBC # BLD: 3.09 M/UL — LOW (ref 4.2–5.4)
RBC # BLD: 3.79 M/UL — LOW (ref 4.2–5.4)
RBC # FLD: 13.9 % — SIGNIFICANT CHANGE UP (ref 11.5–14.5)
RBC # FLD: 14.1 % — SIGNIFICANT CHANGE UP (ref 11.5–14.5)
SARS-COV-2 RNA SPEC QL NAA+PROBE: SIGNIFICANT CHANGE UP
SODIUM SERPL-SCNC: 138 MMOL/L — SIGNIFICANT CHANGE UP (ref 135–146)
TROPONIN T SERPL-MCNC: <0.01 NG/ML — SIGNIFICANT CHANGE UP
WBC # BLD: 11.63 K/UL — HIGH (ref 4.8–10.8)
WBC # BLD: 11.72 K/UL — HIGH (ref 4.8–10.8)
WBC # FLD AUTO: 11.63 K/UL — HIGH (ref 4.8–10.8)
WBC # FLD AUTO: 11.72 K/UL — HIGH (ref 4.8–10.8)

## 2022-04-19 PROCEDURE — 73080 X-RAY EXAM OF ELBOW: CPT | Mod: 26,LT

## 2022-04-19 PROCEDURE — 99497 ADVNCD CARE PLAN 30 MIN: CPT | Mod: 25

## 2022-04-19 PROCEDURE — 99285 EMERGENCY DEPT VISIT HI MDM: CPT | Mod: FS

## 2022-04-19 PROCEDURE — 71045 X-RAY EXAM CHEST 1 VIEW: CPT | Mod: 26

## 2022-04-19 PROCEDURE — 73060 X-RAY EXAM OF HUMERUS: CPT | Mod: 26,LT

## 2022-04-19 PROCEDURE — 93010 ELECTROCARDIOGRAM REPORT: CPT

## 2022-04-19 PROCEDURE — 70450 CT HEAD/BRAIN W/O DYE: CPT | Mod: 26,MA

## 2022-04-19 PROCEDURE — 72170 X-RAY EXAM OF PELVIS: CPT | Mod: 26

## 2022-04-19 PROCEDURE — 99223 1ST HOSP IP/OBS HIGH 75: CPT

## 2022-04-19 RX ORDER — PANTOPRAZOLE SODIUM 20 MG/1
40 TABLET, DELAYED RELEASE ORAL EVERY 12 HOURS
Refills: 0 | Status: DISCONTINUED | OUTPATIENT
Start: 2022-04-19 | End: 2022-04-23

## 2022-04-19 RX ORDER — ONDANSETRON 8 MG/1
4 TABLET, FILM COATED ORAL EVERY 8 HOURS
Refills: 0 | Status: DISCONTINUED | OUTPATIENT
Start: 2022-04-19 | End: 2022-04-23

## 2022-04-19 RX ORDER — NIFEDIPINE 30 MG
60 TABLET, EXTENDED RELEASE 24 HR ORAL DAILY
Refills: 0 | Status: DISCONTINUED | OUTPATIENT
Start: 2022-04-19 | End: 2022-04-23

## 2022-04-19 RX ORDER — ACETAMINOPHEN 500 MG
650 TABLET ORAL EVERY 6 HOURS
Refills: 0 | Status: DISCONTINUED | OUTPATIENT
Start: 2022-04-19 | End: 2022-04-23

## 2022-04-19 RX ORDER — LISINOPRIL 2.5 MG/1
10 TABLET ORAL DAILY
Refills: 0 | Status: DISCONTINUED | OUTPATIENT
Start: 2022-04-19 | End: 2022-04-23

## 2022-04-19 RX ORDER — PANTOPRAZOLE SODIUM 20 MG/1
40 TABLET, DELAYED RELEASE ORAL
Refills: 0 | Status: DISCONTINUED | OUTPATIENT
Start: 2022-04-19 | End: 2022-04-19

## 2022-04-19 RX ORDER — LEVOTHYROXINE SODIUM 125 MCG
1 TABLET ORAL
Qty: 0 | Refills: 2 | DISCHARGE

## 2022-04-19 RX ORDER — BENAZEPRIL HYDROCHLORIDE 40 MG/1
1 TABLET ORAL
Qty: 0 | Refills: 0 | DISCHARGE

## 2022-04-19 RX ORDER — LEVOTHYROXINE SODIUM 125 MCG
75 TABLET ORAL DAILY
Refills: 0 | Status: DISCONTINUED | OUTPATIENT
Start: 2022-04-19 | End: 2022-04-23

## 2022-04-19 RX ORDER — LANOLIN ALCOHOL/MO/W.PET/CERES
3 CREAM (GRAM) TOPICAL AT BEDTIME
Refills: 0 | Status: DISCONTINUED | OUTPATIENT
Start: 2022-04-19 | End: 2022-04-23

## 2022-04-19 RX ORDER — SODIUM CHLORIDE 9 MG/ML
1000 INJECTION INTRAMUSCULAR; INTRAVENOUS; SUBCUTANEOUS
Refills: 0 | Status: DISCONTINUED | OUTPATIENT
Start: 2022-04-19 | End: 2022-04-20

## 2022-04-19 RX ORDER — BACITRACIN ZINC 500 UNIT/G
1 OINTMENT IN PACKET (EA) TOPICAL
Refills: 0 | Status: DISCONTINUED | OUTPATIENT
Start: 2022-04-19 | End: 2022-04-23

## 2022-04-19 RX ORDER — ATORVASTATIN CALCIUM 80 MG/1
10 TABLET, FILM COATED ORAL AT BEDTIME
Refills: 0 | Status: DISCONTINUED | OUTPATIENT
Start: 2022-04-19 | End: 2022-04-23

## 2022-04-19 RX ORDER — APIXABAN 2.5 MG/1
5 TABLET, FILM COATED ORAL EVERY 12 HOURS
Refills: 0 | Status: DISCONTINUED | OUTPATIENT
Start: 2022-04-19 | End: 2022-04-20

## 2022-04-19 RX ORDER — METOPROLOL TARTRATE 50 MG
12.5 TABLET ORAL EVERY 12 HOURS
Refills: 0 | Status: DISCONTINUED | OUTPATIENT
Start: 2022-04-19 | End: 2022-04-23

## 2022-04-19 RX ORDER — ATORVASTATIN CALCIUM 80 MG/1
1 TABLET, FILM COATED ORAL
Qty: 0 | Refills: 3 | DISCHARGE

## 2022-04-19 RX ORDER — NIFEDIPINE 30 MG
1 TABLET, EXTENDED RELEASE 24 HR ORAL
Qty: 0 | Refills: 0 | DISCHARGE

## 2022-04-19 RX ORDER — CHLORHEXIDINE GLUCONATE 213 G/1000ML
1 SOLUTION TOPICAL
Refills: 0 | Status: DISCONTINUED | OUTPATIENT
Start: 2022-04-19 | End: 2022-04-23

## 2022-04-19 RX ADMIN — Medication 12.5 MILLIGRAM(S): at 21:16

## 2022-04-19 RX ADMIN — Medication 1 APPLICATION(S): at 21:30

## 2022-04-19 RX ADMIN — PANTOPRAZOLE SODIUM 40 MILLIGRAM(S): 20 TABLET, DELAYED RELEASE ORAL at 22:36

## 2022-04-19 RX ADMIN — ATORVASTATIN CALCIUM 10 MILLIGRAM(S): 80 TABLET, FILM COATED ORAL at 21:16

## 2022-04-19 NOTE — H&P ADULT - NSHPLABSRESULTS_GEN_ALL_CORE
11.0   11.63 )-----------( 252      ( 19 Apr 2022 15:00 )             34.6       04-19    138  |  101  |  30<H>  ----------------------------<  221<H>  5.0   |  23  |  1.3    Ca    10.7<H>      19 Apr 2022 15:00    TPro  7.4  /  Alb  4.9  /  TBili  0.8  /  DBili  x   /  AST  29  /  ALT  20  /  AlkPhos  82  04-19              CT Head No Cont (04.19.22 @ 12:55)  IMPRESSION:  In comparison with the prior CT scan of the brain dated April 9, 2021:  No acute intracranial hemorrhage.  Stable examination. 11.0   11.63 )-----------( 252      ( 19 Apr 2022 15:00 )             34.6       04-19    138  |  101  |  30<H>  ----------------------------<  221<H>  5.0   |  23  |  1.3    Ca    10.7<H>      19 Apr 2022 15:00    TPro  7.4  /  Alb  4.9  /  TBili  0.8  /  DBili  x   /  AST  29  /  ALT  20  /  AlkPhos  82  04-19          CT Head No Cont (04.19.22): No acute intracranial hemorrhage.

## 2022-04-19 NOTE — H&P ADULT - CONVERSATION DETAILS
Spoke with HCP regarding living will and what her wishes would be if pt heart were to stop beating or pt respiratory status worsened. HCP states at this point she would want all life sustaining methods to be carried out and therefore remain DNR / DNI

## 2022-04-19 NOTE — ED PROVIDER NOTE - OBJECTIVE STATEMENT
89 y/o female presents to the ED s/p fall out of bed today. as per family member, patient was found on floor at foot of bed. patient without any bleeding lacerations or bruising appreciated. patient c/o left elbow pain . no neck or back pain. patinet denies any abdominal or chest pain. as per son, patient had similar fall one week ago and tx at [facility]. patient without any assistance at home with ADLs other than family members.

## 2022-04-19 NOTE — PATIENT PROFILE ADULT - FALL HARM RISK - HARM RISK INTERVENTIONS

## 2022-04-19 NOTE — H&P ADULT - ASSESSMENT
Patient is an 89 year old Female with a past medical history of Hypertension, Hyperlipidemia, Hypothyroidism, Dementia presented to the ER with a chief complaint of recurrent falls and last one being this am. Patient is being admitted to medicine for recurrent falls.     # Recurrent Mechanical Falls secondary to Muscular Deconditioning   - No signs of trauma  - CT Head NC: No acute intracranial hemorrhage.  - Physiatry consult   - Physical Therapy consult   - Ambulate with Assistance   - Fall Risk   - Case management /  consult - Daughter wants STR   - Orthostatics    # Progressional Dementia   # Recent Urinary Tract Infection   - Patient with recent urinary tract infection S/P Cefdinir   - Will check B12, Folate, RVR, Hemoglobin A1C, TSH in AM   - Urinalysis   - Monitor Fevers     # Essential Hypertension    - Monitor Vital Signs    - DASH diet   - Continue with home medications (Nifedipine)   - Benazepril is nonformulary, will order equivalent Lisinopril     # Mixed Hyperlipidemia  - DASH diet   - Continue with home medications (Atorvastatin)   - Lipid panel in AM     # Hypothyroidism   - Continue with Synthroid   - Will check TSH level in AM     # Code Status   - DNR / DNI   - MOLST Form Signed and Witnessed and in the Chart    Patient is an 89 year old Female with a past medical history of Hypertension, Hyperlipidemia, Hypothyroidism, Dementia presented to the ER with a chief complaint of recurrent falls and last one being this am. Patient is being admitted to medicine for recurrent falls.     # Recurrent Mechanical Falls secondary to Muscular Deconditioning   - No signs of trauma  - CT Head NC: No acute intracranial hemorrhage.  - Physiatry consult   - Physical Therapy consult   - Ambulate with Assistance   - Fall Risk   - Case management /  consult - Daughter wants STR   - Orthostatics  - Transthoracic Echocardiogram     # Progressional Dementia   # Recent Urinary Tract Infection   - Patient with recent urinary tract infection S/P Cefdinir   - Will check B12, Folate, RPR, Hemoglobin A1C, TSH in AM   - Urinalysis   - Monitor Fevers     # Essential Hypertension    - Monitor Vital Signs    - DASH diet   - Continue with home medications (Nifedipine)   - Benazepril is nonformulary, will order equivalent Lisinopril     # Mixed Hyperlipidemia  - DASH diet   - Continue with home medications (Atorvastatin)   - Lipid panel in AM     # Hypothyroidism   - Continue with Synthroid   - Will check TSH level in AM     # Code Status   - DNR / DNI   - MOLST Form Signed and Witnessed and in the Chart    Patient is an 89 year old Female with a past medical history of Hypertension, Hyperlipidemia, Hypothyroidism, Dementia presented to the ER with a chief complaint of recurrent falls and last one being this am. Patient is being admitted to medicine for recurrent falls.     # Recurrent Mechanical Falls secondary to Muscular Deconditioning   - No signs of trauma  - CT Head NC: No acute intracranial hemorrhage.  - Physiatry consult   - Physical Therapy consult   - Ambulate with Assistance   - Fall Risk   - Case management /  consult - Daughter wants STR   - Orthostatics    # Acute on Chronic Paroxysmal Atrial Fibrillation   - [id number]: 4 Points   - Will admit to Soft Telemetry  - Cardiac enzymes x 3   - Transthoracic Echocardiogram     # Progressional Dementia   # Recent Urinary Tract Infection   - Patient with recent urinary tract infection S/P Cefdinir   - Will check B12, Folate, RPR, Hemoglobin A1C, TSH in AM   - Urinalysis   - Monitor Fevers     # Essential Hypertension    - Monitor Vital Signs    - DASH diet   - Continue with home medications (Nifedipine)   - Benazepril is nonformulary, will order equivalent Lisinopril     # Mixed Hyperlipidemia  - DASH diet   - Continue with home medications (Atorvastatin)   - Lipid panel in AM     # Hypothyroidism   - Continue with Synthroid   - Will check TSH level in AM     # Code Status   - DNR / DNI   - MOLST Form Signed and Witnessed and in the Chart    Patient is an 89 year old Female with a past medical history of Hypertension, Hyperlipidemia, Hypothyroidism, Dementia presented to the ER with a chief complaint of recurrent falls and last one being this am. Patient is being admitted to medicine for recurrent falls.     # Recurrent Mechanical Falls secondary to Muscular Deconditioning   - No signs of trauma  - CT Head NC: No acute intracranial hemorrhage.  - Physiatry consult   - Physical Therapy consult   - Ambulate with Assistance   - Fall Risk   - Case management /  consult - Daughter wants STR   - Orthostatics    # Acute on Chronic Paroxysmal Atrial Fibrillation   - [id number]: 4 Points   - Will admit to Soft Telemetry  - Cardiac enzymes x 3   - Transthoracic Echocardiogram     # Progressional Dementia   # Recent Urinary Tract Infection   - Patient with recent urinary tract infection S/P Cefdinir   - Will check B12, Folate, RPR, Hemoglobin A1C, TSH in AM   - Urinalysis   - Monitor Fevers     # Right Upper Quadrant & Epigastric Abdominal Pain   - LFTs within normal limits   - Abdominal Ultrasound   - CPK, Amylase, Lipase level    # Essential Hypertension    - Monitor Vital Signs    - DASH diet   - Continue with home medications (Nifedipine)   - Benazepril is nonformulary, will order equivalent Lisinopril     # Mixed Hyperlipidemia  - DASH diet   - Continue with home medications (Atorvastatin)   - Lipid panel in AM     # Hypothyroidism   - Continue with Synthroid   - Will check TSH level in AM     # Code Status   - DNR / DNI   - MOLST Form Signed and Witnessed and in the Chart    89 year old Female with a past medical history of Hypertension, Hyperlipidemia, Hypothyroidism, Dementia presented to the ER with a chief complaint of recurrent falls and last one being this am. Patient is being admitted to medicine for recurrent falls.     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  - Will check B12, Folate, RPR, Hemoglobin A1C, TSH in AM, CK  - Physiatry consult   - Physical Therapy consult   - Ambulate with Assistance   - Fall Risk   - Case management /  consult - Daughter wants STR   - Orthostatics  - check UA    # Right Upper Quadrant & Epigastric Abdominal Pain   - LFTs within normal limits   - Abdominal Ultrasound   - Amylase, Lipase level    # Paroxysmal Atrial Fibrillation with RVR  - HR 120s in ER   - [id number]: 4 Points   - Will admit to Soft Telemetry  - Cardiac enzymes x 3   - Transthoracic Echocardiogram   - start 12.5 q12  - start Eliquis  - cardiology consult      # Progressional Dementia   # Recent Urinary Tract Infection   - Patient with recent urinary tract infection S/P Cefdinir     - Urinalysis   - Monitor Fevers     # Essential Hypertension    - Monitor Vital Signs    - DASH diet   - Continue with home medications (Nifedipine)   - Benazepril is non formulary, will order equivalent Lisinopril     # Mixed Hyperlipidemia  - DASH diet   - Continue with home medications (Atorvastatin)   - Lipid panel in AM     # Hypothyroidism   - Continue with Synthroid   - Will check TSH level in AM     # Code Status   - DNR / DNI   - MOLST Form Signed and Witnessed and in the Chart    89 year old Female with a past medical history of Hypertension, Hyperlipidemia, Hypothyroidism, Dementia presented to the ER with a chief complaint of recurrent falls and last one being this am. Patient is being admitted to medicine for recurrent falls.     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  - Will check B12, Folate, RPR, Hemoglobin A1C, TSH in AM, CK  - Physiatry consult   - Physical Therapy consult   - Ambulate with Assistance   - Fall Risk   - Case management /  consult - Daughter wants STR   - Orthostatics  - check UA    # Right Upper Quadrant & Epigastric Abdominal Pain   - LFTs within normal limits   - Abdominal Ultrasound   - Amylase, Lipase level    # Paroxysmal Atrial Fibrillation with RVR  - HR 120s in ER   - [id number]: 4 Points   - Will admit to Soft Telemetry  - Cardiac enzymes x 3   - Transthoracic Echocardiogram   - start Metoprolol 12.5 q12  - start Eliquis 5 BID  - cardiology consult      # Progressional Dementia   # Recent Urinary Tract Infection   - Patient with recent urinary tract infection S/P Cefdinir     - Urinalysis   - Monitor Fevers     # Essential Hypertension    - Monitor Vital Signs    - DASH diet   - Continue with home medications (Nifedipine)   - Benazepril is non formulary, will order equivalent Lisinopril     # Mixed Hyperlipidemia  - DASH diet   - Continue with home medications (Atorvastatin)   - Lipid panel in AM     # Hypothyroidism   - Continue with Synthroid   - Will check TSH level in AM     # Code Status   - DNR / DNI   - MOLST Form Signed and Witnessed and in the Chart

## 2022-04-19 NOTE — ED PROVIDER NOTE - PHYSICAL EXAMINATION
Vital Signs: I have reviewed the initial vital signs.  Constitutional: elderly and frail, no acute distress, normocephalic  Eyes: PERRLA, EOMI, , clear conjunctiva  ENT: MMM, TM b/l clear , no nasal congestion  Cardiovascular: regular rate, regular rhythm, no murmur appreciated  Respiratory: unlabored respiratory effort, clear to auscultation bilaterally, no chest wall tenderness  Gastrointestinal: soft, non-tender, non-distended  abdomen, no pulsatile mass  Musculoskeletal: supple neck, no cervical tenderness, pelvis stable,  no lower extremity edema, no bony tenderness  Integumentary: warm, dry, no rash  Neurologic: awake, alert, cranial nerves II-XII grossly intact, extremities’ motor and sensory functions grossly intact, no focal deficits

## 2022-04-19 NOTE — ED PROVIDER NOTE - ATTENDING APP SHARED VISIT CONTRIBUTION OF CARE
89-year-old female to ED status post fall at home.  Patient with multiple recent falls brought to ED by family is concerned about home living situation.  No fevers no sick contacts no cough or shortness of breath.  Patient denies any pain other than left elbow.  Afebrile vital signs stable exam as noted clear lungs bilaterally conjunctiva pink HEENT normal, regular rate and rhythm abdomen soft nontender and neuro nonfocal.

## 2022-04-19 NOTE — H&P ADULT - NS ATTEND AMEND GEN_ALL_CORE FT
Patient seen and examined at bedside. Not in acute distress.   Agree with the assessment and plan above.

## 2022-04-19 NOTE — H&P ADULT - NSICDXFAMILYHX_GEN_ALL_CORE_FT
FAMILY HISTORY:  No pertinent family history in first degree relatives FAMILY HISTORY:  Patient unable to provide medical history

## 2022-04-19 NOTE — H&P ADULT - NSICDXPASTSURGICALHX_GEN_ALL_CORE_FT
PAST SURGICAL HISTORY:  No significant past surgical history     
[facility],   Phone: [phone]  Fax: (   )    -

## 2022-04-19 NOTE — ED PROVIDER NOTE - NS ED ATTENDING STATEMENT MOD
This was a shared visit with the MARY. I reviewed and verified the documentation and independently performed the documented:

## 2022-04-19 NOTE — H&P ADULT - HISTORY OF PRESENT ILLNESS
Patient is an 89 year old Female with a past medical history of Hypertension, Hyperlipidemia, Hypothyroidism, Dementia presented to the ER with a chief complaint of recurrent falls and last one being this am. As per Daughter, patient was found on the floor by her bed this AM, no urinary incontinence, no fractures, no LOC, no trauma, no bruising. Similar fall 4/13 and was seen by [facility] and was discharged without any assistance. Since discharge, patient has not ambulated adequately with the walker. Patient was found to have a UTI and was treated with PO Antibiotics which was completed today. Daughter believes patient's dementia is worsening as she is going back to 1960s. Patient denies fevers, chills, nausea, vomiting, new onset of headaches, visual changes, auditory changes, neck stiffness, chest pain, shortness of breath, palpitations, abdominal pain, diarrhea, constipation, dysuria, new onset of leg swelling, new onset of rashes.  Patient is an 89 year old Female with a past medical history of Hypertension, Hyperlipidemia, Hypothyroidism, Dementia presented to the ER with a chief complaint of recurrent falls and last one being this am. As per Daughter, patient was found on the floor by her bed this AM, no urinary incontinence, no fractures, no LOC, no trauma, no bruising. Similar fall 4/13 and was seen by [facility] and was discharged without any assistance. Since discharge, patient has not ambulated adequately with the walker. Patient was found to have Atrial fibrillation (was not sent home with any meds) and UTI and was treated with PO Antibiotics which was completed today. Daughter believes patient's dementia is worsening as she is going back to 1960s. Patient denies fevers, chills, nausea, vomiting, new onset of headaches, visual changes, auditory changes, neck stiffness, chest pain, shortness of breath, palpitations, abdominal pain, diarrhea, constipation, dysuria, new onset of leg swelling, new onset of rashes.  Patient is an 89 year old Female with a past medical history of Hypertension, Hyperlipidemia, Hypothyroidism, Dementia presented to the ER with a chief complaint of recurrent falls and last one being this am. As per Daughter, patient was found on the floor by her bed this AM, no urinary incontinence, no fractures, no LOC, no trauma, no bruising. Similar fall 4/13 and was seen by [facility] and was discharged without any assistance. Since discharge, patient has not ambulated adequately with the walker. Patient was found to have Atrial fibrillation (was not sent home with any meds) and UTI and was treated with PO Antibiotics which was completed today. Daughter believes patient's dementia is worsening as she is going back to 1960s. Patient admits to epigastric and RUQ pain. Patient denies fevers, chills, nausea, vomiting, new onset of headaches, visual changes, auditory changes, neck stiffness, chest pain, shortness of breath, palpitations, diarrhea, constipation, dysuria, new onset of leg swelling, new onset of rashes.

## 2022-04-19 NOTE — H&P ADULT - NSHPPHYSICALEXAM_GEN_ALL_CORE
VITAL SIGNS: Vital Signs Last 24 Hrs  T(C): 36.4 (19 Apr 2022 16:06), Max: 36.4 (19 Apr 2022 16:06)  T(F): 97.5 (19 Apr 2022 16:06), Max: 97.5 (19 Apr 2022 16:06)  HR: 103 (19 Apr 2022 16:06) (103 - 115)  BP: 155/72 (19 Apr 2022 16:06) (147/63 - 155/72)  RR: 18 (19 Apr 2022 16:06) (18 - 18)  SpO2: 98% (19 Apr 2022 16:06) (98% - 98%)    GENERAL: NAD, lying in bed  HEAD:  Atraumatic, Normocephalic, No Armas signs   EYES: Conjunctiva and sclera clear, No raccoon eyes  ENT: Moist mucous membranes  NECK: Supple  CHEST/LUNG: Clear to auscultation bilaterally. Unlabored respirations  HEART: Regular rate and rhythm  ABDOMEN: Soft, Nontender, Nondistended  EXTREMITIES:  No calf tenderness   NERVOUS SYSTEM:  Alert & Oriented x 2, speech clear  MSK: FROM all 4 extremities, full and equal strength  SKIN: No rashes or lesions GENERAL: NAD, lying in bed  HEAD:  Atraumatic, Normocephalic, No Armas signs   EYES: Conjunctiva and sclera clear, No raccoon eyes  ENT: Moist mucous membranes  NECK: Supple  CHEST/LUNG: Clear to auscultation bilaterally. Unlabored respirations  HEART: Regular rate and rhythm  ABDOMEN: Soft, tenderness noted on the RUQ and epigastic region, Nondistended  EXTREMITIES:  No calf tenderness   NERVOUS SYSTEM:  Alert & Oriented x 2, speech clear  MSK: FROM all 4 extremities, full and equal strength  SKIN: No rashes or lesions

## 2022-04-19 NOTE — ED PROVIDER NOTE - NS ED ROS FT
Review of Systems    Constitutional: (-) fever/ chills (-)loss of appetite or  weight loss  Eyes (-) visual changes  ENT: (-) epistaxis (-) sore throat (-) ear pain  Cardiovascular: (-) chest pain, (-) syncope (-) palpitations  Respiratory: (-) cough, (-) shortness of breath  Gastrointestinal: (-) vomiting, (-) diarrhea (-) abdominal pain  : (-) dysuria , hematuria   neck: (-) neck pain or stiffness  Musculoskeletal:  (-) back pain, (-) joint pain   Integumentary: (-) rash, (-) swelling  Neurological: (-) headache, (-) altered mental status

## 2022-04-20 LAB
A1C WITH ESTIMATED AVERAGE GLUCOSE RESULT: 6.8 % — HIGH (ref 4–5.6)
ALBUMIN SERPL ELPH-MCNC: 3.6 G/DL — SIGNIFICANT CHANGE UP (ref 3.5–5.2)
ALP SERPL-CCNC: 54 U/L — SIGNIFICANT CHANGE UP (ref 30–115)
ALT FLD-CCNC: 14 U/L — SIGNIFICANT CHANGE UP (ref 0–41)
ANION GAP SERPL CALC-SCNC: 12 MMOL/L — SIGNIFICANT CHANGE UP (ref 7–14)
APPEARANCE UR: CLEAR — SIGNIFICANT CHANGE UP
APTT BLD: 32.9 SEC — SIGNIFICANT CHANGE UP (ref 27–39.2)
AST SERPL-CCNC: 25 U/L — SIGNIFICANT CHANGE UP (ref 0–41)
BACTERIA # UR AUTO: ABNORMAL
BILIRUB SERPL-MCNC: 0.6 MG/DL — SIGNIFICANT CHANGE UP (ref 0.2–1.2)
BILIRUB UR-MCNC: NEGATIVE — SIGNIFICANT CHANGE UP
BUN SERPL-MCNC: 27 MG/DL — HIGH (ref 10–20)
CALCIUM SERPL-MCNC: 9.6 MG/DL — SIGNIFICANT CHANGE UP (ref 8.5–10.1)
CHLORIDE SERPL-SCNC: 108 MMOL/L — SIGNIFICANT CHANGE UP (ref 98–110)
CHOLEST SERPL-MCNC: 98 MG/DL — SIGNIFICANT CHANGE UP
CK MB CFR SERPL CALC: 2.4 NG/ML — SIGNIFICANT CHANGE UP (ref 0.6–6.3)
CK MB CFR SERPL CALC: 2.9 NG/ML — SIGNIFICANT CHANGE UP (ref 0.6–6.3)
CK SERPL-CCNC: 35 U/L — SIGNIFICANT CHANGE UP (ref 0–225)
CK SERPL-CCNC: 40 U/L — SIGNIFICANT CHANGE UP (ref 0–225)
CO2 SERPL-SCNC: 22 MMOL/L — SIGNIFICANT CHANGE UP (ref 17–32)
COLOR SPEC: YELLOW — SIGNIFICANT CHANGE UP
CREAT SERPL-MCNC: 1.2 MG/DL — SIGNIFICANT CHANGE UP (ref 0.7–1.5)
DIFF PNL FLD: NEGATIVE — SIGNIFICANT CHANGE UP
EGFR: 43 ML/MIN/1.73M2 — LOW
EPI CELLS # UR: ABNORMAL /HPF
ESTIMATED AVERAGE GLUCOSE: 148 MG/DL — HIGH (ref 68–114)
FOLATE SERPL-MCNC: 3.5 NG/ML — LOW
GLUCOSE BLDC GLUCOMTR-MCNC: 93 MG/DL — SIGNIFICANT CHANGE UP (ref 70–99)
GLUCOSE SERPL-MCNC: 122 MG/DL — HIGH (ref 70–99)
GLUCOSE UR QL: NEGATIVE MG/DL — SIGNIFICANT CHANGE UP
HCT VFR BLD CALC: 22.9 % — LOW (ref 37–47)
HCT VFR BLD CALC: 25.2 % — LOW (ref 37–47)
HDLC SERPL-MCNC: 38 MG/DL — LOW
HGB BLD-MCNC: 7.3 G/DL — LOW (ref 12–16)
HGB BLD-MCNC: 8 G/DL — LOW (ref 12–16)
INR BLD: 1.13 RATIO — SIGNIFICANT CHANGE UP (ref 0.65–1.3)
KETONES UR-MCNC: NEGATIVE — SIGNIFICANT CHANGE UP
LEUKOCYTE ESTERASE UR-ACNC: ABNORMAL
LIPID PNL WITH DIRECT LDL SERPL: 40 MG/DL — SIGNIFICANT CHANGE UP
MCHC RBC-ENTMCNC: 28.8 PG — SIGNIFICANT CHANGE UP (ref 27–31)
MCHC RBC-ENTMCNC: 29.2 PG — SIGNIFICANT CHANGE UP (ref 27–31)
MCHC RBC-ENTMCNC: 31.7 G/DL — LOW (ref 32–37)
MCHC RBC-ENTMCNC: 31.9 G/DL — LOW (ref 32–37)
MCV RBC AUTO: 90.6 FL — SIGNIFICANT CHANGE UP (ref 81–99)
MCV RBC AUTO: 91.6 FL — SIGNIFICANT CHANGE UP (ref 81–99)
NITRITE UR-MCNC: NEGATIVE — SIGNIFICANT CHANGE UP
NON HDL CHOLESTEROL: 60 MG/DL — SIGNIFICANT CHANGE UP
NRBC # BLD: 0 /100 WBCS — SIGNIFICANT CHANGE UP (ref 0–0)
NRBC # BLD: 0 /100 WBCS — SIGNIFICANT CHANGE UP (ref 0–0)
PH UR: 6.5 — SIGNIFICANT CHANGE UP (ref 5–8)
PLATELET # BLD AUTO: 170 K/UL — SIGNIFICANT CHANGE UP (ref 130–400)
PLATELET # BLD AUTO: 188 K/UL — SIGNIFICANT CHANGE UP (ref 130–400)
POTASSIUM SERPL-MCNC: 4.4 MMOL/L — SIGNIFICANT CHANGE UP (ref 3.5–5)
POTASSIUM SERPL-SCNC: 4.4 MMOL/L — SIGNIFICANT CHANGE UP (ref 3.5–5)
PROT SERPL-MCNC: 5.5 G/DL — LOW (ref 6–8)
PROT UR-MCNC: NEGATIVE MG/DL — SIGNIFICANT CHANGE UP
PROTHROM AB SERPL-ACNC: 13 SEC — HIGH (ref 9.95–12.87)
RBC # BLD: 2.5 M/UL — LOW (ref 4.2–5.4)
RBC # BLD: 2.78 M/UL — LOW (ref 4.2–5.4)
RBC # FLD: 14.1 % — SIGNIFICANT CHANGE UP (ref 11.5–14.5)
RBC # FLD: 14.1 % — SIGNIFICANT CHANGE UP (ref 11.5–14.5)
RBC CASTS # UR COMP ASSIST: SIGNIFICANT CHANGE UP /HPF
SODIUM SERPL-SCNC: 142 MMOL/L — SIGNIFICANT CHANGE UP (ref 135–146)
SP GR SPEC: 1.02 — SIGNIFICANT CHANGE UP (ref 1.01–1.03)
T PALLIDUM AB TITR SER: NEGATIVE — SIGNIFICANT CHANGE UP
TRIGL SERPL-MCNC: 101 MG/DL — SIGNIFICANT CHANGE UP
TROPONIN T SERPL-MCNC: <0.01 NG/ML — SIGNIFICANT CHANGE UP
TROPONIN T SERPL-MCNC: <0.01 NG/ML — SIGNIFICANT CHANGE UP
TSH SERPL-MCNC: 0.11 UIU/ML — LOW (ref 0.27–4.2)
UROBILINOGEN FLD QL: 0.2 MG/DL — SIGNIFICANT CHANGE UP
VIT B12 SERPL-MCNC: 345 PG/ML — SIGNIFICANT CHANGE UP (ref 232–1245)
WBC # BLD: 7.6 K/UL — SIGNIFICANT CHANGE UP (ref 4.8–10.8)
WBC # BLD: 8.85 K/UL — SIGNIFICANT CHANGE UP (ref 4.8–10.8)
WBC # FLD AUTO: 7.6 K/UL — SIGNIFICANT CHANGE UP (ref 4.8–10.8)
WBC # FLD AUTO: 8.85 K/UL — SIGNIFICANT CHANGE UP (ref 4.8–10.8)
WBC UR QL: ABNORMAL /HPF

## 2022-04-20 PROCEDURE — 93306 TTE W/DOPPLER COMPLETE: CPT | Mod: 26

## 2022-04-20 PROCEDURE — 74176 CT ABD & PELVIS W/O CONTRAST: CPT | Mod: 26

## 2022-04-20 PROCEDURE — 99223 1ST HOSP IP/OBS HIGH 75: CPT

## 2022-04-20 PROCEDURE — 76705 ECHO EXAM OF ABDOMEN: CPT | Mod: 26

## 2022-04-20 PROCEDURE — 99233 SBSQ HOSP IP/OBS HIGH 50: CPT

## 2022-04-20 PROCEDURE — 99222 1ST HOSP IP/OBS MODERATE 55: CPT

## 2022-04-20 RX ORDER — SODIUM CHLORIDE 9 MG/ML
1000 INJECTION INTRAMUSCULAR; INTRAVENOUS; SUBCUTANEOUS
Refills: 0 | Status: DISCONTINUED | OUTPATIENT
Start: 2022-04-20 | End: 2022-04-23

## 2022-04-20 RX ORDER — SODIUM CHLORIDE 9 MG/ML
500 INJECTION INTRAMUSCULAR; INTRAVENOUS; SUBCUTANEOUS ONCE
Refills: 0 | Status: COMPLETED | OUTPATIENT
Start: 2022-04-20 | End: 2022-04-20

## 2022-04-20 RX ADMIN — PANTOPRAZOLE SODIUM 40 MILLIGRAM(S): 20 TABLET, DELAYED RELEASE ORAL at 06:03

## 2022-04-20 RX ADMIN — SODIUM CHLORIDE 1000 MILLILITER(S): 9 INJECTION INTRAMUSCULAR; INTRAVENOUS; SUBCUTANEOUS at 17:16

## 2022-04-20 RX ADMIN — Medication 12.5 MILLIGRAM(S): at 06:03

## 2022-04-20 RX ADMIN — Medication 60 MILLIGRAM(S): at 06:03

## 2022-04-20 RX ADMIN — Medication 12.5 MILLIGRAM(S): at 18:16

## 2022-04-20 RX ADMIN — LISINOPRIL 10 MILLIGRAM(S): 2.5 TABLET ORAL at 06:03

## 2022-04-20 RX ADMIN — Medication 75 MICROGRAM(S): at 06:03

## 2022-04-20 RX ADMIN — PANTOPRAZOLE SODIUM 40 MILLIGRAM(S): 20 TABLET, DELAYED RELEASE ORAL at 18:15

## 2022-04-20 RX ADMIN — Medication 1 APPLICATION(S): at 18:16

## 2022-04-20 RX ADMIN — Medication 1 APPLICATION(S): at 06:03

## 2022-04-20 RX ADMIN — SODIUM CHLORIDE 65 MILLILITER(S): 9 INJECTION INTRAMUSCULAR; INTRAVENOUS; SUBCUTANEOUS at 08:50

## 2022-04-20 RX ADMIN — ATORVASTATIN CALCIUM 10 MILLIGRAM(S): 80 TABLET, FILM COATED ORAL at 21:13

## 2022-04-20 NOTE — CONSULT NOTE ADULT - ASSESSMENT
89yFemale pmh dementia, HTN, hypothyroid presents for frequent falls. GI consulted for coffee ground emesis and initial epigastric pain.     Problem 1-Anemia  coffee ground emesis yesterday  -Dose of Eliquis yesterday  Rec  -Based off GFR patient will benefit from 3 days off eliquis prior to EGD  -The benefits of endoscopy as well as the risks, such as GI bleeding, aspiration pneumonia, perforation, damage or loss of teeth, reaction to medication, or death  were reviewed with the patient.   -PPI IV BID  -Maintain Hemodynamic Stability   -Monitor CBC  -Negative COVID-19 Test within 3 days for intervention   -CMP,Optimize Electrolytes  -PT,PTT,INR  -EKG, Chest-Xray   -Transfuse prn to hgb >8  -Two large bore IV lines  -Monitor Vital Signs  -Monitor Stool For blood, frequency, consistency, melena  -Active Type and Screen  -Iron Studies, Folate, Vitamin B12 levels  89yFemale pmh dementia, HTN, hypothyroid presents for frequent falls. GI consulted for coffee ground emesis and initial epigastric pain.     Problem 1-Anemia  coffee ground emesis yesterday  -Dose of Eliquis yesterday  Rec  -CT abdomen and pelvis no contrast r/o retroperitoneal bleed  -Based off GFR patient will benefit from 3 days off eliquis prior to EGD  -The benefits of endoscopy as well as the risks, such as GI bleeding, aspiration pneumonia, perforation, damage or loss of teeth, reaction to medication, or death  were reviewed with the patient.   -PPI IV BID  -Maintain Hemodynamic Stability   -Monitor CBC  -Negative COVID-19 Test within 3 days for intervention   -CMP,Optimize Electrolytes  -PT,PTT,INR  -EKG, Chest-Xray   -Transfuse prn to hgb >8  -Two large bore IV lines  -Monitor Vital Signs  -Monitor Stool For blood, frequency, consistency, melena  -Active Type and Screen  -Iron Studies, Folate, Vitamin B12 levels  89yFemale pmh dementia, HTN, hypothyroid presents for frequent falls. GI consulted for coffee ground emesis and initial epigastric pain.     Problem 1-Anemia  coffee ground emesis yesterday  -Dose of Eliquis yesterday  Rec  -CT abdomen and pelvis no contrast r/o retroperitoneal bleed  -Based off GFR patient will benefit from 3 days off eliquis prior to EGD, aim for EGD Friday  -The benefits of endoscopy as well as the risks, such as GI bleeding, aspiration pneumonia, perforation, damage or loss of teeth, reaction to medication, or death  were reviewed with the patient.   -PPI IV BID  -Maintain Hemodynamic Stability   -Monitor CBC  -Negative COVID-19 Test within 3 days for intervention   -CMP,Optimize Electrolytes  -PT,PTT,INR  -EKG, Chest-Xray   -Transfuse prn to hgb >8  -Two large bore IV lines  -Monitor Vital Signs  -Monitor Stool For blood, frequency, consistency, melena  -Active Type and Screen  -Iron Studies, Folate, Vitamin B12 levels

## 2022-04-20 NOTE — ADVANCED PRACTICE NURSE CONSULT - RECOMMEDATIONS
Plan: Clean B/l buttock with soap and water , pat dry then apply triad hydrophilic dressing   Pressure  injury  preventive  measures  skin care   Assess wound and inform primary provider of any changes   Case discussed with primary Rn  Wound/ ostomy specialist  to f/u as needed     Offloading: [ x] Frequent position changes [ ] Devices/Equipment  Cleansing: [ ] Saline [ x] Soap/Water [ ] Other: ______  Topicals: [ x] Barrier Cream [ ] Antimicrobial [ ] Enzymatic Wound Debridement  Dressings: [ ] Dry, sterile [ ] Foam [ ] Absorbant Pads [ ] Collagenase

## 2022-04-20 NOTE — CONSULT NOTE ADULT - SUBJECTIVE AND OBJECTIVE BOX
Chief complaint/Reason for consult: coffee ground emesis    HPI:  Patient is an 89 year old Female with a past medical history of Hypertension, Hyperlipidemia, Hypothyroidism, Dementia presented to the ER with a chief complaint of recurrent falls and last one being this am. As per Daughter, patient was found on the floor by her bed this AM, no urinary incontinence, no fractures, no LOC, no trauma, no bruising. Similar fall 4/13 and was seen by [facility] and was discharged without any assistance. Since discharge, patient has not ambulated adequately with the walker. Patient was found to have Atrial fibrillation (was not sent home with any meds) and UTI and was treated with PO Antibiotics which was completed today. Daughter believes patient's dementia is worsening as she is going back to 1960s. Patient admits to epigastric and RUQ pain. Patient denies fevers, chills, nausea, vomiting, new onset of headaches, visual changes, auditory changes, neck stiffness, chest pain, shortness of breath, palpitations, diarrhea, constipation, dysuria, new onset of leg swelling, new onset of rashes.  (19 Apr 2022 17:22)    GI Updates: 89[facility], HTN, hypothyroid presents for frequent falls. GI consulted for coffee ground emesis and initial epigastric pain. Currently Patient denies nausea, vomiting, hematemesis, melena, blood in stool, diarrhea, constipation, abdominal pain. Patient had one episode of coffee ground emesis yesterday morning,      PAST MEDICAL & SURGICAL HISTORY:  HTN (hypertension)    Hypothyroid    Hyperlipidemia    Dementia    No significant past surgical history          Family history:  FAMILY HISTORY:  Patient unable to provide medical history      No GI cancers in first or second degree relatives    Social History: No smoking. No alcohol. No illegal drug use.    Allergies:   No Known Allergies          MEDICATIONS: Home Medications:  ATORVASTATIN 10MG TABLETS: 1 tab(s) orally once a day (at bedtime) (19 Apr 2022 17:33)  benazepril 10 mg oral tablet: 1 tab(s) orally once a day (19 Apr 2022 17:33)  LEVOTHYROXINE 0.075MG (75MCG) TABS: 1 tab(s) orally once a day (19 Apr 2022 17:33)  Nifedical XL 60 mg oral tablet, extended release: 1 tab(s) orally once a day (19 Apr 2022 17:33)      MEDICATIONS  (STANDING):  atorvastatin 10 milliGRAM(s) Oral at bedtime  BACItracin   Ointment 1 Application(s) Topical two times a day  chlorhexidine 4% Liquid 1 Application(s) Topical <User Schedule>  levothyroxine 75 MICROGram(s) Oral daily  lisinopril 10 milliGRAM(s) Oral daily  metoprolol tartrate 12.5 milliGRAM(s) Oral every 12 hours  NIFEdipine XL 60 milliGRAM(s) Oral daily  pantoprazole  Injectable 40 milliGRAM(s) IV Push every 12 hours  sodium chloride 0.9%. 1000 milliLiter(s) (65 mL/Hr) IV Continuous <Continuous>    MEDICATIONS  (PRN):  acetaminophen     Tablet .. 650 milliGRAM(s) Oral every 6 hours PRN Temp greater or equal to 38C (100.4F), Mild Pain (1 - 3)  aluminum hydroxide/magnesium hydroxide/simethicone Suspension 30 milliLiter(s) Oral every 4 hours PRN Dyspepsia  melatonin 3 milliGRAM(s) Oral at bedtime PRN Insomnia  ondansetron Injectable 4 milliGRAM(s) IV Push every 8 hours PRN Nausea and/or Vomiting        REVIEW OF SYSTEMS  General:  No weight loss, fevers, or chills.  Eyes:  No reported pain or visual changes  ENT:  No sore throat or runny nose.  NECK: No stiffness or lymphadenopathy  CV:  No chest pain or palpitations.  Resp:  No shortness of breath, cough, wheezing or hemoptysis  GI:  No abdominal pain, nausea, vomiting, dysphagia, diarrhea or constipation. No rectal bleeding, melena, or hematemesis.  Muscle:  No aches or weakness  Neuro:  No tingling, numbness       VITALS:   T(F): 96.1 (04-20-22 @ 06:00), Max: 99.4 (04-19-22 @ 20:00)  HR: 79 (04-20-22 @ 06:00) (79 - 115)  BP: 149/77 (04-19-22 @ 21:29) (129/67 - 155/73)  RR: 18 (04-19-22 @ 21:29) (18 - 18)  SpO2: 97% (04-20-22 @ 06:00) (96% - 98%)    PHYSICAL EXAM:  GENERAL: AAOx3, no acute distress.  HEAD:  Atraumatic, Normocephalic  EYES: conjunctiva and sclera clear  NECK: Supple, No thyromegaly   CHEST/LUNG: Clear to auscultation bilaterally; No wheeze, rhonchi, or rales  HEART: Regular rate and rhythm; normal S1, S2, No murmurs.  ABDOMEN: Soft, nontender, nondistended; Bowel sounds present  NEUROLOGY: No asterixis or tremor  SKIN: Intact, no jaundice  Rectal exam-brown stool in rectal vault and on finger        LABS:  04-20    142  |  108  |  27<H>  ----------------------------<  122<H>  4.4   |  22  |  1.2    Ca    9.6      20 Apr 2022 07:12    TPro  5.5<L>  /  Alb  3.6  /  TBili  0.6  /  DBili  x   /  AST  25  /  ALT  14  /  AlkPhos  54  04-20                          8.0    8.85  )-----------( 188      ( 20 Apr 2022 07:12 )             25.2     LIVER FUNCTIONS - ( 20 Apr 2022 07:12 )  Alb: 3.6 g/dL / Pro: 5.5 g/dL / ALK PHOS: 54 U/L / ALT: 14 U/L / AST: 25 U/L / GGT: x               IMAGING:    < from: Xray Chest 1 View-PORTABLE IMMEDIATE (Xray Chest 1 View-PORTABLE IMMEDIATE .) (04.19.22 @ 13:34) >    ACC: [id number] EXAM:  XR CHEST PORTABLE IMMED 1V                          PROCEDURE DATE:  04/19/2022          INTERPRETATION:  Clinical History / Reason for exam: Shortness of breath    Comparison : Chest radiograph July 17, 2021.    Technique/Positioning: Low lung volume.    Findings:    Support devices: None.    Cardiac/mediastinum/hilum: Magnified, unchanged.    Lung parenchyma/Pleura: Within normal limits.    Skeleton/soft tissues: Stable    Impression:    Low lung volume.    No acute infiltrates.    --- End of Report ---            ADRIANA MCLEOD MD; Attending Radiologist  This document has been electronically signed. Apr 19 2022  9:16PM    < end of copied text >

## 2022-04-20 NOTE — CONSULT NOTE ADULT - SUBJECTIVE AND OBJECTIVE BOX
HPI:  Patient is an 89 year old Female with a past medical history of Hypertension, Hyperlipidemia, Hypothyroidism, Dementia presented to the ER with a chief complaint of recurrent falls and last one being this am. As per Daughter, patient was found on the floor by her bed this AM, no urinary incontinence, no fractures, no LOC, no trauma, no bruising. Similar fall 4/13 and was seen by [facility] and was discharged without any assistance. Since discharge, patient has not ambulated adequately with the walker. Patient was found to have Atrial fibrillation (was not sent home with any meds) and UTI and was treated with PO Antibiotics which was completed today. Daughter believes patient's dementia is worsening as she is going back to 1960s. Patient admits to epigastric and RUQ pain. Patient denies fevers, chills, nausea, vomiting, new onset of headaches, visual changes, auditory changes, neck stiffness, chest pain, shortness of breath, palpitations, diarrhea, constipation, dysuria, new onset of leg swelling, new onset of rashes.  (19 Apr 2022 17:22)        HPI-Cardiology   Pt evaluated at bedside with Dr Crain. Currently admitted telemetry for s/p fall and AFib Radiology tests and hospital records, were reviewed, as well as previous notes on this patient. Pt has dementia at baseline and is A&O x2. Pt daughter at bedside, and aper Pt's daughter, the Pt has been falling more frequently recently, with recent fall in April and was admitted in [facility], and was also found to have AFib but was not sent home with any meds. Per Pt's daughter Pt was found in the floor, yesetrday in am, but no LOC. Pt's daughter states that the Pt has been declining and she has been getting more forgetful. Pt denies any CP, SOB, headache, dizziness/lightheadedness.       PAST MEDICAL & SURGICAL HISTORY  HTN (hypertension)    Hypothyroid    Hyperlipidemia    Dementia    No significant past surgical history        FAMILY HISTORY:  FAMILY HISTORY:  Patient unable to provide medical history        SOCIAL HISTORY:  []smoker-denies  []Alcohol-denies  []Drug-denies      ALLERGIES:  No Known Allergies      MEDICATIONS:  MEDICATIONS  (STANDING):  atorvastatin 10 milliGRAM(s) Oral at bedtime  BACItracin   Ointment 1 Application(s) Topical two times a day  chlorhexidine 4% Liquid 1 Application(s) Topical <User Schedule>  levothyroxine 75 MICROGram(s) Oral daily  lisinopril 10 milliGRAM(s) Oral daily  metoprolol tartrate 12.5 milliGRAM(s) Oral every 12 hours  NIFEdipine XL 60 milliGRAM(s) Oral daily  pantoprazole  Injectable 40 milliGRAM(s) IV Push every 12 hours  sodium chloride 0.9%. 1000 milliLiter(s) (65 mL/Hr) IV Continuous <Continuous>    MEDICATIONS  (PRN):  acetaminophen     Tablet .. 650 milliGRAM(s) Oral every 6 hours PRN Temp greater or equal to 38C (100.4F), Mild Pain (1 - 3)  aluminum hydroxide/magnesium hydroxide/simethicone Suspension 30 milliLiter(s) Oral every 4 hours PRN Dyspepsia  melatonin 3 milliGRAM(s) Oral at bedtime PRN Insomnia  ondansetron Injectable 4 milliGRAM(s) IV Push every 8 hours PRN Nausea and/or Vomiting      HOME MEDICATIONS:  Home Medications:  ATORVASTATIN 10MG TABLETS: 1 tab(s) orally once a day (at bedtime) (19 Apr 2022 17:33)  benazepril 10 mg oral tablet: 1 tab(s) orally once a day (19 Apr 2022 17:33)  LEVOTHYROXINE 0.075MG (75MCG) TABS: 1 tab(s) orally once a day (19 Apr 2022 17:33)  Nifedical XL 60 mg oral tablet, extended release: 1 tab(s) orally once a day (19 Apr 2022 17:33)      VITALS:   T(F): 96.1 (04-20 @ 06:00), Max: 99.4 (04-19 @ 20:00)  HR: 79 (04-20 @ 06:00) (79 - 115)  BP: 149/77 (04-19 @ 21:29) (129/67 - 155/73)  BP(mean): --  RR: 18 (04-19 @ 21:29) (18 - 18)  SpO2: 97% (04-20 @ 06:00) (96% - 98%)        REVIEW OF SYSTEMS:  CONSTITUTIONAL: No weakness, fevers or chills  EYES: No visual changes  ENT: No vertigo or throat pain   NECK: No pain or stiffness  RESPIRATORY: No cough, wheezing, hemoptysis; No shortness of breath  CARDIOVASCULAR: No chest pain or palpitations  GASTROINTESTINAL: No abdominal or epigastric pain. No nausea, vomiting, or hematemesis; No diarrhea or constipation. No melena or hematochezia.  GENITOURINARY: No dysuria, frequency or hematuria  NEUROLOGICAL: No numbness or weakness  SKIN: No itching, no rashes  MSK: no    PHYSICAL EXAM:  NEURO: patient is awake , alert and oriented  GEN: Not in acute distress  NECK: no thyroid enlargement, no JVD  LUNGS: Clear to auscultation bilaterally   CARDIOVASCULAR: S1/S2 present, RRR , no murmurs or rubs, no carotid bruits,  + PP bilaterally  ABD: Soft, non-tender, non-distended, +BS  EXT: No TERESITA  SKIN: Intact    LABS:                        8.0    8.85  )-----------( 188      ( 20 Apr 2022 07:12 )             25.2     04-20    142  |  108  |  27<H>  ----------------------------<  122<H>  4.4   |  22  |  1.2    Ca    9.6      20 Apr 2022 07:12    TPro  5.5<L>  /  Alb  3.6  /  TBili  0.6  /  DBili  x   /  AST  25  /  ALT  14  /  AlkPhos  54  04-20      Creatine Kinase, Serum: 40 U/L (04-20-22 @ 07:12)  Troponin T, Serum: <0.01 ng/mL (04-20-22 @ 07:12)  Creatine Kinase, Serum: 53 U/L (04-19-22 @ 19:50)  Troponin T, Serum: <0.01 ng/mL (04-19-22 @ 19:50)    CARDIAC MARKERS ( 20 Apr 2022 07:12 )  x     / <0.01 ng/mL / 40 U/L / x     / 2.9 ng/mL  CARDIAC MARKERS ( 19 Apr 2022 19:50 )  x     / <0.01 ng/mL / 53 U/L / x     / 2.9 ng/mL        04-20 Chol 98 LDL -- HDL 38<L> Trig 101      RADIOLOGY:  -CXR:  < from: Xray Chest 1 View-PORTABLE IMMEDIATE (Xray Chest 1 View-PORTABLE IMMEDIATE .) (04.19.22 @ 13:34) >  Impression:    Low lung volume.    No acute infiltrates.    --- End of Report ---    < end of copied text >      ECG:  < from: 12 Lead ECG (04.19.22 @ 13:17) >  Atrial fibrillation with rapid ventricular response  Cannot rule out Anterior infarct , age undetermined  Abnormal ECG    Confirmed by JOSETTE CRAIN MD ([id number]) on 4/19/2022 1:55:51 PM    < end of copied text >   HPI:  Patient is an 89 year old Female with a past medical history of Hypertension, Hyperlipidemia, Hypothyroidism, Dementia presented to the ER with a chief complaint of recurrent falls and last one being this am. As per Daughter, patient was found on the floor by her bed this AM, no urinary incontinence, no fractures, no LOC, no trauma, no bruising. Similar fall 4/13 and was seen by [facility] and was discharged without any assistance. Since discharge, patient has not ambulated adequately with the walker. Patient was found to have Atrial fibrillation (was not sent home with any meds) and UTI and was treated with PO Antibiotics which was completed today. Daughter believes patient's dementia is worsening as she is going back to 1960s. Patient admits to epigastric and RUQ pain. Patient denies fevers, chills, nausea, vomiting, new onset of headaches, visual changes, auditory changes, neck stiffness, chest pain, shortness of breath, palpitations, diarrhea, constipation, dysuria, new onset of leg swelling, new onset of rashes.  (19 Apr 2022 17:22)        HPI-Cardiology   Pt evaluated at bedside with Dr Crain. Currently admitted telemetry for s/p fall and AFib Radiology tests and hospital records, were reviewed, as well as previous notes on this patient. Pt has dementia at baseline and is A&O x2. Pt daughter at bedside, and aper Pt's daughter, the Pt has been falling more frequently recently, with recent fall in April and was admitted in [facility], and was also found to have AFib but was not sent home with any meds. Per Pt's daughter Pt was found in the floor, yesetrday in am, but no LOC. Pt's daughter states that the Pt has been declining and she has been getting more forgetful. Pt denies any CP, SOB, headache, dizziness/lightheadedness.       PAST MEDICAL & SURGICAL HISTORY  HTN (hypertension)    Hypothyroid    Hyperlipidemia    Dementia    No significant past surgical history        FAMILY HISTORY:  FAMILY HISTORY:  Patient unable to provide medical history        SOCIAL HISTORY:  []smoker-denies  []Alcohol-denies  []Drug-denies      ALLERGIES:  No Known Allergies      MEDICATIONS:  MEDICATIONS  (STANDING):  atorvastatin 10 milliGRAM(s) Oral at bedtime  BACItracin   Ointment 1 Application(s) Topical two times a day  chlorhexidine 4% Liquid 1 Application(s) Topical <User Schedule>  levothyroxine 75 MICROGram(s) Oral daily  lisinopril 10 milliGRAM(s) Oral daily  metoprolol tartrate 12.5 milliGRAM(s) Oral every 12 hours  NIFEdipine XL 60 milliGRAM(s) Oral daily  pantoprazole  Injectable 40 milliGRAM(s) IV Push every 12 hours  sodium chloride 0.9%. 1000 milliLiter(s) (65 mL/Hr) IV Continuous <Continuous>    MEDICATIONS  (PRN):  acetaminophen     Tablet .. 650 milliGRAM(s) Oral every 6 hours PRN Temp greater or equal to 38C (100.4F), Mild Pain (1 - 3)  aluminum hydroxide/magnesium hydroxide/simethicone Suspension 30 milliLiter(s) Oral every 4 hours PRN Dyspepsia  melatonin 3 milliGRAM(s) Oral at bedtime PRN Insomnia  ondansetron Injectable 4 milliGRAM(s) IV Push every 8 hours PRN Nausea and/or Vomiting      HOME MEDICATIONS:  Home Medications:  ATORVASTATIN 10MG TABLETS: 1 tab(s) orally once a day (at bedtime) (19 Apr 2022 17:33)  benazepril 10 mg oral tablet: 1 tab(s) orally once a day (19 Apr 2022 17:33)  LEVOTHYROXINE 0.075MG (75MCG) TABS: 1 tab(s) orally once a day (19 Apr 2022 17:33)  Nifedical XL 60 mg oral tablet, extended release: 1 tab(s) orally once a day (19 Apr 2022 17:33)      VITALS:   T(F): 96.1 (04-20 @ 06:00), Max: 99.4 (04-19 @ 20:00)  HR: 79 (04-20 @ 06:00) (79 - 115)  BP: 149/77 (04-19 @ 21:29) (129/67 - 155/73)  BP(mean): --  RR: 18 (04-19 @ 21:29) (18 - 18)  SpO2: 97% (04-20 @ 06:00) (96% - 98%)        REVIEW OF SYSTEMS:  See HPI      PHYSICAL EXAM:  NEURO: patient is awake , alert and oriented  GEN: Not in acute distress  NECK: no thyroid enlargement, no JVD  LUNGS: Clear to auscultation bilaterally   CARDIOVASCULAR: S1/S2 present, Irregular rate and rthym, no murmurs or rubs, no carotid bruits,  + PP bilaterally  ABD: Soft, non-tender, non-distended, +BS  EXT: No TERESITA  SKIN: Intact    LABS:                        8.0    8.85  )-----------( 188      ( 20 Apr 2022 07:12 )             25.2     04-20    142  |  108  |  27<H>  ----------------------------<  122<H>  4.4   |  22  |  1.2    Ca    9.6      20 Apr 2022 07:12    TPro  5.5<L>  /  Alb  3.6  /  TBili  0.6  /  DBili  x   /  AST  25  /  ALT  14  /  AlkPhos  54  04-20      Creatine Kinase, Serum: 40 U/L (04-20-22 @ 07:12)  Troponin T, Serum: <0.01 ng/mL (04-20-22 @ 07:12)  Creatine Kinase, Serum: 53 U/L (04-19-22 @ 19:50)  Troponin T, Serum: <0.01 ng/mL (04-19-22 @ 19:50)    CARDIAC MARKERS ( 20 Apr 2022 07:12 )  x     / <0.01 ng/mL / 40 U/L / x     / 2.9 ng/mL  CARDIAC MARKERS ( 19 Apr 2022 19:50 )  x     / <0.01 ng/mL / 53 U/L / x     / 2.9 ng/mL        04-20 Chol 98 LDL -- HDL 38<L> Trig 101      RADIOLOGY:  -CXR:  < from: Xray Chest 1 View-PORTABLE IMMEDIATE (Xray Chest 1 View-PORTABLE IMMEDIATE .) (04.19.22 @ 13:34) >  Impression:    Low lung volume.    No acute infiltrates.    --- End of Report ---    < end of copied text >      ECG:  < from: 12 Lead ECG (04.19.22 @ 13:17) >  Atrial fibrillation with rapid ventricular response  Cannot rule out Anterior infarct , age undetermined  Abnormal ECG    Confirmed by JOSETTE CRAIN MD ([id number]) on 4/19/2022 1:55:51 PM    < end of copied text >

## 2022-04-20 NOTE — PROGRESS NOTE ADULT - ASSESSMENT
89 year old Female with a past medical history of Hypertension, Hyperlipidemia, Hypothyroidism, Dementia presented to the ER with a chief complaint of recurrent falls and last one being this am. Patient is being admitted to medicine for recurrent falls.     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  PLAN  - f/u check B12, Folate, RPR, Hemoglobin A1C, TSH in AM, CK  - Physiatry consult and Physical Therapy consult  - Ambulate with Assistance;  Fall Risk   - Case management /  consult - Daughter wants STR   - Orthostatics  - check UA    # Acute anemia and coffee ground emesis, r/o GIB  # Right Upper Quadrant & Epigastric Abdominal Pain   PLAN  - GI on board  - CT abdomen and pelvis no contrast r/o retroperitoneal bleed  - Plan for EGD Friday; will need negative COVID-19 Test within 3 days for intervention   - PPI IV BID  - Monitor CBC  - Transfuse prn to hgb >8  - Two large bore IV lines    # Paroxysmal Atrial Fibrillation with RVR  - HR 120s in ER   - [id number]: 4 Points   - HAS BLED score: 3(5.8% risk for bleeding)  - ECHO: EF 60-65%;  Mildly enlarged left atrium. Mild mitral valve regurgitation. There is mild aortic root calcification. There is no evidence of pericardial effusion.  - patient was started on Eliquis on 4/19 and had episode of coffee ground emesis yesterday and Eliquis was stopped   PLAN  - Cardio on board  - cont metoprolol 12.5mg BID and Procardia 60mg PO daily  - Given Hx of frequent falls risk for bleeding slightly higher than risk for stroke on being on AC; Cardio discussed with Pt's daughter at bedside and aware of risks vs benefits of AC    # Progressional Dementia   # Recent Urinary Tract Infection   - Patient with recent urinary tract infection S/P Cefdinir   - Urinalysis   - Monitor Fevers     # Essential Hypertension    - Monitor Vital Signs    - DASH diet   - Continue with home medications (Nifedipine)   - Benazepril is non formulary, will order equivalent Lisinopril     # Mixed Hyperlipidemia  - DASH diet   - Continue with home medications (Atorvastatin)   - Lipid panel in AM     # Hypothyroidism   - Continue with Synthroid   - Will check TSH level in AM     # Code Status   - DNR / DNI   - MOLST Form Signed and Witnessed and in the Chart     Progress Note Handoff  Pending Consults: None  Pending Tests: EGD Friday  Pending Results: clinical improvement   Family Discussion: Patient and grand-daugher; unable to get a hold of daughter  Disposition: Home___X__/SNF______/Other_____/Unknown at this time_____  Spent over 35 min reviewing chart and on coordinating patient care during interdisciplinary rounds

## 2022-04-20 NOTE — CONSULT NOTE ADULT - ASSESSMENT
89 year old Female with a past medical history of Hypertension, Hyperlipidemia, Hypothyroidism, Dementia presented to the ER with a chief complaint of recurrent falls and last one being yesterday.       Impression:  #AFib  #HLD  #HTN  #Recurrent falls    Was started on Eliquis and Metoprolol 12.5mg PO BID on admission. Had episode of coffee ground emesis yesterday and Eliquis was stopped   -HR currently controlled 70-90's   -ECG: Afib  -CHADS2-VASc: 4(4.8% stroke risk)  -HAS BLED score: 3(5.8% risk for bleeding)  -TTE pending    Plan:  -Given Hx of frequent falls risk for bleeding slightly higher than risk for stroke on being on AC. Discussed with Pt's daughter at bedside and aware of risks vs benefits of AC  -C/w Metoprolol 12.5mg PO BID and Procardia 60mg PO daily with parameters  -Repeat ECG  -C/w Lisinopril and Lipitor  -PT eval  -Outpatient cardiology f/u    Discussed with Dr Rice

## 2022-04-20 NOTE — ADVANCED PRACTICE NURSE CONSULT - ASSESSMENT
Patient is an 89 year old Female with a past medical history of Hypertension, Hyperlipidemia, Hypothyroidism, Dementia presented to the ER with a chief complaint of recurrent falls and last one being this am. As per Daughter, patient was found on the floor by her bed this AM, no urinary incontinence, no fractures, no LOC, no trauma, no bruising. Similar fall 4/13 and was seen by [facility] [place] and was discharged without any assistance. Since discharge, patient has not ambulated adequately with the walker. Patient was found to have Atrial fibrillation (was not sent home with any meds) and UTI and was treated with PO Antibiotics which was completed today. Daughter believes patient's dementia is worsening as she is going back to 1960s. Patient admits to epigastric and RUQ pain. Patient denies fevers, chills, nausea, vomiting, new onset of headaches, visual changes, auditory changes, neck stiffness, chest pain, shortness of breath, palpitations, diarrhea, constipation, dysuria, new onset of leg swelling, new onset of rashes.    PAST MEDICAL & SURGICAL HISTORY:  HTN (hypertension)  Hypothyroid  Hyperlipidemia  Dementia  No significant past surgical history    Assessment:  Patient received in bed, alert but confused .                      Skin assessed-  B/L buttock moisture associated dermatitis with  redness and denuded skin      Pressure injury  #1  Location:  Sacrum   Type : stage I   Size:  1x1  Tissue Description : Redness    Wound Exudate : None    Wound Edge:  Intact   Periwound Condition :  Macerated

## 2022-04-20 NOTE — PHYSICAL THERAPY INITIAL EVALUATION ADULT - GENERAL OBSERVATIONS, REHAB EVAL
11:05-11:28 Chart reviewed. Patient available to be seen for physical therapy, denies pain, confirmed with RN.  Pt rec'd in bed +IV, +Tele box in NAD.

## 2022-04-20 NOTE — PHYSICAL THERAPY INITIAL EVALUATION ADULT - ADDITIONAL COMMENTS
Pt is poor historian, reports she lives with daughter in house, not sure of steps or if she uses AD for amb.

## 2022-04-20 NOTE — CONSULT NOTE ADULT - ASSESSMENT
IMPRESSION: Rehab of 89 y/o  f rehab  for  GD  sp  fall     PRECAUTIONS: [  ] Cardiac  [  ] Respiratory  [  ] Seizures [  ] Contact Isolation  [  ] Droplet Isolation  [ FALL ] Other    Weight Bearing Status:     RECOMMENDATION:    Out of Bed to Chair     DVT/Decubiti Prophylaxis    REHAB PLAN:     [ xx  ] Bedside P/T 3-5 times a week   [   ]   Bedside O/T  2-3 times a week             [   ] No Rehab Therapy Indicated                   [   ]  Speech Therapy   Conditioning/ROM                                    ADL  Bed Mobility                                               Conditioning/ROM  Transfers                                                     Bed Mobility  Sitting /Standing Balance                         Transfers                                        Gait Training                                               Sitting/Standing Balance  Stair Training [   ]Applicable                    Home equipment Eval                                                                        Splinting  [   ] Only      GOALS:   ADL   [  x ]   Independent                    Transfers  [ x  ] Independent                          Ambulation  [x   ] Independent     [x    ] With device                            [x   ]  CG                                                         [x   ]  CG                                                                  [  x ] CG                            [    ] Min A                                                   [   ] Min A                                                              [   ] Min  A          DISCHARGE PLAN:   [   ]  Good candidate for Intensive Rehabilitation/Hospital based-4A [facility]                                             Will tolerate 3hrs Intensive Rehab Daily                                       [xx    ]  Short Term Rehab in Skilled Nursing Facility ptn  may  need  LTc  vs  24 hr  care risk of  fall                                        [    ]  Home with Outpatient or VN services                                         [    ]  Possible Candidate for Intensive Hospital based Rehab

## 2022-04-20 NOTE — CONSULT NOTE ADULT - NS ATTEND AMEND GEN_ALL_CORE FT
89 yo with hx dementia frequent fall. Patient in afib, Now with GI bleed. Patient not a candidate for AC, Discussed with family. Aware risk benefits, But they do not want AC. Cotrol HR
Patient with coffee ground emesis and drop in Hb. To be off Elquis for 3 days. EGd on Friday.

## 2022-04-20 NOTE — CONSULT NOTE ADULT - SUBJECTIVE AND OBJECTIVE BOX
HPI: 89 year old Female with a past medical history of Hypertension, Hyperlipidemia, Hypothyroidism, Dementia presented to the ER with a chief complaint of recurrent falls and last one being this am. As per Daughter, patient was found on the floor by her bed this AM, no urinary incontinence, no fractures, no LOC, no trauma, no bruising. Similar fall 4/13 and was seen by [facility] and was discharged without any assistance. Since discharge, patient has not ambulated adequately with the walker. Patient was found to have Atrial fibrillation (was not sent home with any meds) and UTI and was treated with PO Antibiotics which was completed today. Daughter believes patient's dementia is worsening as she is going back to 1960s. Patient admits to epigastric and RUQ pain. Patient denies fevers, chills, nausea, vomiting, new onset of headaches, visual changes, auditory changes, neck stiffness, chest pain, shortness of breath, palpitations, diarrhea, constipation, dysuria, new onset of leg swelling, new onset of rashes.  ptn  seen at  bed side nad  fu  simple  command  no  new  c.o .    PTN  REFERRED TO ACUTE  REHAB  FOR  EVAL AND  TX   PAST MEDICAL & SURGICAL HISTORY:  HTN (hypertension)    Hypothyroid    Hyperlipidemia    Dementia    No significant past surgical history        Hospital Course:    TODAY'S SUBJECTIVE & REVIEW OF SYMPTOMS:     Constitutional WNL   Cardio WNL   Resp WNL   GI WNL  Heme WNL  Endo WNL  Skin WNL  MSK WNL  Neuro WNL  Cognitive WNL  Psych WNL      MEDICATIONS  (STANDING):  atorvastatin 10 milliGRAM(s) Oral at bedtime  BACItracin   Ointment 1 Application(s) Topical two times a day  chlorhexidine 4% Liquid 1 Application(s) Topical <User Schedule>  levothyroxine 75 MICROGram(s) Oral daily  lisinopril 10 milliGRAM(s) Oral daily  metoprolol tartrate 12.5 milliGRAM(s) Oral every 12 hours  NIFEdipine XL 60 milliGRAM(s) Oral daily  pantoprazole  Injectable 40 milliGRAM(s) IV Push every 12 hours  sodium chloride 0.9%. 1000 milliLiter(s) (65 mL/Hr) IV Continuous <Continuous>    MEDICATIONS  (PRN):  acetaminophen     Tablet .. 650 milliGRAM(s) Oral every 6 hours PRN Temp greater or equal to 38C (100.4F), Mild Pain (1 - 3)  aluminum hydroxide/magnesium hydroxide/simethicone Suspension 30 milliLiter(s) Oral every 4 hours PRN Dyspepsia  melatonin 3 milliGRAM(s) Oral at bedtime PRN Insomnia  ondansetron Injectable 4 milliGRAM(s) IV Push every 8 hours PRN Nausea and/or Vomiting      FAMILY HISTORY:  Patient unable to provide medical history        Allergies    No Known Allergies    Intolerances        SOCIAL HISTORY:    [  ] Etoh  [  ] Smoking  [  ] Substance abuse     Home Environment:  [ x ] Home Alone  [  ] Lives with Family  [  ] Home Health Aid    Dwelling:  [ x ] Apartment  [ x ] Private House  [  ] Adult Home  [  ] Skilled Nursing Facility      [  ] Short Term  [  ] Long Term  [ x] Stairs       Elevator [  ]    FUNCTIONAL STATUS PTA: (Check all that apply)  Ambulation: [ x  ]Independent    [  ] Dependent     [  ] Non-Ambulatory  Assistive Device: [  ] SA Cane  [  ]  Q Cane  [ x ] Walker  [  ]  Wheelchair  ADL : [  x] Independent  [  ]  Dependent       Vital Signs Last 24 Hrs  T(C): 35.6 (20 Apr 2022 06:00), Max: 37.4 (19 Apr 2022 20:00)  T(F): 96.1 (20 Apr 2022 06:00), Max: 99.4 (19 Apr 2022 20:00)  HR: 79 (20 Apr 2022 06:00) (79 - 115)  BP: 149/77 (19 Apr 2022 21:29) (129/67 - 155/73)  BP(mean): --  RR: 18 (19 Apr 2022 21:29) (18 - 18)  SpO2: 97% (20 Apr 2022 06:00) (96% - 98%)      PHYSICAL EXAM: Alert & Oriented X 1  GENERAL: NAD, well-groomed, well-developed  HEAD:  Atraumatic, Normocephalic  EYES: EOMI, PERRLA, conjunctiva and sclera clear  NECK: Supple, No JVD, Normal thyroid  CHEST/LUNG: Clear to percussion bilaterally; No rales, rhonchi, wheezing, or rubs  HEART: Regular rate and rhythm; No murmurs, rubs, or gallops  ABDOMEN: Soft, Nontender, Nondistended; Bowel sounds present  EXTREMITIES:  2+ Peripheral Pulses, No clubbing, cyanosis, or edema    NERVOUS SYSTEM:  Cranial Nerves 2-12 intact [ x ] Abnormal  [  ]  ROM: WFL all extremities [ p ]  Abnormal [  ]  Motor Strength: WFL all extremities  [  ]  Abnormal [4/5  ]  Sensation: intact to light touch [ x ] Abnormal [  ]  Reflexes: Symmetric [ x ]  Abnormal [  ]    FUNCTIONAL STATUS:  Bed Mobility: Independent [  ]  Supervision [  ]  Needs Assistance [x  ]  N/A [  ]  Transfers: Independent [  ]  Supervision [  ]  Needs Assistance [ x ]  N/A [  ]   Ambulation: Independent [  ]  Supervision [  ]  Needs Assistance [ x ]  N/A [  ]  ADL: Independent [  ] Requires Assistance [  ] N/A [ x ]  SEE PT/OT IE NOTES    LABS:                        8.0    8.85  )-----------( 188      ( 20 Apr 2022 07:12 )             25.2     04-19    138  |  101  |  30<H>  ----------------------------<  221<H>  5.0   |  23  |  1.3    Ca    10.7<H>      19 Apr 2022 15:00    TPro  7.4  /  Alb  4.9  /  TBili  0.8  /  DBili  x   /  AST  29  /  ALT  20  /  AlkPhos  82  04-19          RADIOLOGY & ADDITIONAL STUDIES:     Assesment:

## 2022-04-21 LAB
ALBUMIN SERPL ELPH-MCNC: 3.3 G/DL — LOW (ref 3.5–5.2)
ALP SERPL-CCNC: 53 U/L — SIGNIFICANT CHANGE UP (ref 30–115)
ALT FLD-CCNC: 13 U/L — SIGNIFICANT CHANGE UP (ref 0–41)
ANION GAP SERPL CALC-SCNC: 12 MMOL/L — SIGNIFICANT CHANGE UP (ref 7–14)
AST SERPL-CCNC: 21 U/L — SIGNIFICANT CHANGE UP (ref 0–41)
BILIRUB SERPL-MCNC: 2.5 MG/DL — HIGH (ref 0.2–1.2)
BUN SERPL-MCNC: 17 MG/DL — SIGNIFICANT CHANGE UP (ref 10–20)
CALCIUM SERPL-MCNC: 9 MG/DL — SIGNIFICANT CHANGE UP (ref 8.5–10.1)
CHLORIDE SERPL-SCNC: 111 MMOL/L — HIGH (ref 98–110)
CO2 SERPL-SCNC: 21 MMOL/L — SIGNIFICANT CHANGE UP (ref 17–32)
CREAT SERPL-MCNC: 1.1 MG/DL — SIGNIFICANT CHANGE UP (ref 0.7–1.5)
EGFR: 48 ML/MIN/1.73M2 — LOW
GLUCOSE SERPL-MCNC: 86 MG/DL — SIGNIFICANT CHANGE UP (ref 70–99)
HCT VFR BLD CALC: 28.7 % — LOW (ref 37–47)
HGB BLD-MCNC: 9.2 G/DL — LOW (ref 12–16)
MCHC RBC-ENTMCNC: 29 PG — SIGNIFICANT CHANGE UP (ref 27–31)
MCHC RBC-ENTMCNC: 32.1 G/DL — SIGNIFICANT CHANGE UP (ref 32–37)
MCV RBC AUTO: 90.5 FL — SIGNIFICANT CHANGE UP (ref 81–99)
NRBC # BLD: 0 /100 WBCS — SIGNIFICANT CHANGE UP (ref 0–0)
PLATELET # BLD AUTO: 149 K/UL — SIGNIFICANT CHANGE UP (ref 130–400)
POTASSIUM SERPL-MCNC: 4.2 MMOL/L — SIGNIFICANT CHANGE UP (ref 3.5–5)
POTASSIUM SERPL-SCNC: 4.2 MMOL/L — SIGNIFICANT CHANGE UP (ref 3.5–5)
PROT SERPL-MCNC: 5 G/DL — LOW (ref 6–8)
RBC # BLD: 3.17 M/UL — LOW (ref 4.2–5.4)
RBC # FLD: 14.1 % — SIGNIFICANT CHANGE UP (ref 11.5–14.5)
SARS-COV-2 RNA SPEC QL NAA+PROBE: SIGNIFICANT CHANGE UP
SODIUM SERPL-SCNC: 144 MMOL/L — SIGNIFICANT CHANGE UP (ref 135–146)
WBC # BLD: 6.63 K/UL — SIGNIFICANT CHANGE UP (ref 4.8–10.8)
WBC # FLD AUTO: 6.63 K/UL — SIGNIFICANT CHANGE UP (ref 4.8–10.8)

## 2022-04-21 PROCEDURE — 99232 SBSQ HOSP IP/OBS MODERATE 35: CPT

## 2022-04-21 PROCEDURE — 99233 SBSQ HOSP IP/OBS HIGH 50: CPT

## 2022-04-21 RX ADMIN — PANTOPRAZOLE SODIUM 40 MILLIGRAM(S): 20 TABLET, DELAYED RELEASE ORAL at 05:53

## 2022-04-21 RX ADMIN — Medication 60 MILLIGRAM(S): at 05:52

## 2022-04-21 RX ADMIN — Medication 1 APPLICATION(S): at 05:52

## 2022-04-21 RX ADMIN — PANTOPRAZOLE SODIUM 40 MILLIGRAM(S): 20 TABLET, DELAYED RELEASE ORAL at 17:36

## 2022-04-21 RX ADMIN — LISINOPRIL 10 MILLIGRAM(S): 2.5 TABLET ORAL at 05:52

## 2022-04-21 RX ADMIN — SODIUM CHLORIDE 65 MILLILITER(S): 9 INJECTION INTRAMUSCULAR; INTRAVENOUS; SUBCUTANEOUS at 21:46

## 2022-04-21 RX ADMIN — ATORVASTATIN CALCIUM 10 MILLIGRAM(S): 80 TABLET, FILM COATED ORAL at 21:51

## 2022-04-21 RX ADMIN — Medication 12.5 MILLIGRAM(S): at 05:52

## 2022-04-21 RX ADMIN — Medication 12.5 MILLIGRAM(S): at 17:36

## 2022-04-21 RX ADMIN — Medication 75 MICROGRAM(S): at 05:52

## 2022-04-21 NOTE — PROGRESS NOTE ADULT - ASSESSMENT
89 year old Female with a past medical history of Hypertension, Hyperlipidemia, Hypothyroidism, Dementia presented to the ER with a chief complaint of recurrent falls and last one being this am. Patient is being admitted to medicine for recurrent falls.     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  PLAN  - Physiatry consult and Physical Therapy consult  - Ambulate with Assistance;  Fall Risk   - Case management /  consult - Daughter wants STR   - Orthostatics    # Acute anemia and coffee ground emesis, r/o GIB  # Right Upper Quadrant & Epigastric Abdominal Pain   - CT abd/pelvis: No evidence of acute abdominopelvic pathology on this unenhanced exam. Specifically no evidence of intra-abdominal or retroperitoneal   hemorrhage.  - s/p 1 U pRBC 4/20  PLAN  - GI on board  - CT abdomen and pelvis no contrast r/o retroperitoneal bleed  - Plan for EGD tomorrow; will need negative COVID-19 Test within 3 days for intervention   - NPO after MN  - PPI IV BID  - Monitor CBC  - Transfuse prn to hgb >8  - Two large bore IV lines    # Paroxysmal Atrial Fibrillation with RVR  - HR 120s in ER   - [id number]: 4 Points   - HAS BLED score: 3(5.8% risk for bleeding)  - ECHO: EF 60-65%;  Mildly enlarged left atrium. Mild mitral valve regurgitation. There is mild aortic root calcification. There is no evidence of pericardial effusion.  - patient was started on Eliquis on 4/19 and had episode of coffee ground emesis yesterday and Eliquis was stopped   PLAN  - Cardio on board  - cont metoprolol 12.5mg BID and Procardia 60mg PO daily  - Given Hx of frequent falls risk for bleeding slightly higher than risk for stroke on being on AC; Cardio discussed with Pt's daughter at bedside and aware of risks vs benefits of AC    # Progressional Dementia   # Recent Urinary Tract Infection   - Patient with recent urinary tract infection S/P Cefdinir   - Urinalysis   - Monitor Fevers     # Essential Hypertension    - Monitor Vital Signs    - DASH diet   - Continue with home medications (Nifedipine)   - Benazepril is non formulary, will order equivalent Lisinopril     # Mixed Hyperlipidemia  - DASH diet   - Continue with home medications (Atorvastatin)     # Hypothyroidism   - TSH: 0.11  - Continue with Synthroid   - will check total T3 and free T4 level    # Code Status   - DNR / DNI   - MOLST Form Signed and Witnessed and in the Chart     Progress Note Handoff  Pending Consults: None  Pending Tests: EGD Friday  Pending Results: clinical improvement   Family Discussion: Patient and daugher  Disposition: Home___Xacute currently, but likely STR in 2-3 days__/SNF______/Other_____/Unknown at this time_____  Spent over 35 min reviewing chart and on coordinating patient care during interdisciplinary rounds

## 2022-04-22 ENCOUNTER — TRANSCRIPTION ENCOUNTER (OUTPATIENT)
Age: 87
End: 2022-04-22

## 2022-04-22 LAB
ALBUMIN SERPL ELPH-MCNC: 3.9 G/DL — SIGNIFICANT CHANGE UP (ref 3.5–5.2)
ALP SERPL-CCNC: 65 U/L — SIGNIFICANT CHANGE UP (ref 30–115)
ALT FLD-CCNC: 15 U/L — SIGNIFICANT CHANGE UP (ref 0–41)
ANION GAP SERPL CALC-SCNC: 13 MMOL/L — SIGNIFICANT CHANGE UP (ref 7–14)
APTT BLD: 34.5 SEC — SIGNIFICANT CHANGE UP (ref 27–39.2)
AST SERPL-CCNC: 27 U/L — SIGNIFICANT CHANGE UP (ref 0–41)
BILIRUB DIRECT SERPL-MCNC: 0.3 MG/DL — SIGNIFICANT CHANGE UP (ref 0–0.3)
BILIRUB SERPL-MCNC: 1.7 MG/DL — HIGH (ref 0.2–1.2)
BLD GP AB SCN SERPL QL: SIGNIFICANT CHANGE UP
BUN SERPL-MCNC: 12 MG/DL — SIGNIFICANT CHANGE UP (ref 10–20)
CALCIUM SERPL-MCNC: 9.7 MG/DL — SIGNIFICANT CHANGE UP (ref 8.5–10.1)
CHLORIDE SERPL-SCNC: 108 MMOL/L — SIGNIFICANT CHANGE UP (ref 98–110)
CO2 SERPL-SCNC: 21 MMOL/L — SIGNIFICANT CHANGE UP (ref 17–32)
CREAT SERPL-MCNC: 1 MG/DL — SIGNIFICANT CHANGE UP (ref 0.7–1.5)
CULTURE RESULTS: NO GROWTH — SIGNIFICANT CHANGE UP
EGFR: 54 ML/MIN/1.73M2 — LOW
GLUCOSE SERPL-MCNC: 73 MG/DL — SIGNIFICANT CHANGE UP (ref 70–99)
HCT VFR BLD CALC: 35.1 % — LOW (ref 37–47)
HGB BLD-MCNC: 11.7 G/DL — LOW (ref 12–16)
INR BLD: 1.07 RATIO — SIGNIFICANT CHANGE UP (ref 0.65–1.3)
MCHC RBC-ENTMCNC: 30.2 PG — SIGNIFICANT CHANGE UP (ref 27–31)
MCHC RBC-ENTMCNC: 33.3 G/DL — SIGNIFICANT CHANGE UP (ref 32–37)
MCV RBC AUTO: 90.5 FL — SIGNIFICANT CHANGE UP (ref 81–99)
NRBC # BLD: 0 /100 WBCS — SIGNIFICANT CHANGE UP (ref 0–0)
PLATELET # BLD AUTO: 187 K/UL — SIGNIFICANT CHANGE UP (ref 130–400)
POTASSIUM SERPL-MCNC: 4.7 MMOL/L — SIGNIFICANT CHANGE UP (ref 3.5–5)
POTASSIUM SERPL-SCNC: 4.7 MMOL/L — SIGNIFICANT CHANGE UP (ref 3.5–5)
PROT SERPL-MCNC: 6.3 G/DL — SIGNIFICANT CHANGE UP (ref 6–8)
PROTHROM AB SERPL-ACNC: 12.3 SEC — SIGNIFICANT CHANGE UP (ref 9.95–12.87)
RBC # BLD: 3.88 M/UL — LOW (ref 4.2–5.4)
RBC # FLD: 14.2 % — SIGNIFICANT CHANGE UP (ref 11.5–14.5)
SODIUM SERPL-SCNC: 142 MMOL/L — SIGNIFICANT CHANGE UP (ref 135–146)
SPECIMEN SOURCE: SIGNIFICANT CHANGE UP
T3 SERPL-MCNC: 57 NG/DL — LOW (ref 80–200)
T4 FREE SERPL-MCNC: 1.4 NG/DL — SIGNIFICANT CHANGE UP (ref 0.9–1.8)
WBC # BLD: 6.03 K/UL — SIGNIFICANT CHANGE UP (ref 4.8–10.8)
WBC # FLD AUTO: 6.03 K/UL — SIGNIFICANT CHANGE UP (ref 4.8–10.8)

## 2022-04-22 PROCEDURE — 43239 EGD BIOPSY SINGLE/MULTIPLE: CPT

## 2022-04-22 PROCEDURE — 99232 SBSQ HOSP IP/OBS MODERATE 35: CPT

## 2022-04-22 PROCEDURE — 88305 TISSUE EXAM BY PATHOLOGIST: CPT | Mod: 26

## 2022-04-22 PROCEDURE — 76937 US GUIDE VASCULAR ACCESS: CPT | Mod: 26,59

## 2022-04-22 PROCEDURE — 88312 SPECIAL STAINS GROUP 1: CPT | Mod: 26

## 2022-04-22 PROCEDURE — 36569 INSJ PICC 5 YR+ W/O IMAGING: CPT

## 2022-04-22 RX ADMIN — Medication 12.5 MILLIGRAM(S): at 17:48

## 2022-04-22 RX ADMIN — CHLORHEXIDINE GLUCONATE 1 APPLICATION(S): 213 SOLUTION TOPICAL at 06:08

## 2022-04-22 RX ADMIN — ATORVASTATIN CALCIUM 10 MILLIGRAM(S): 80 TABLET, FILM COATED ORAL at 21:50

## 2022-04-22 RX ADMIN — Medication 60 MILLIGRAM(S): at 05:55

## 2022-04-22 RX ADMIN — PANTOPRAZOLE SODIUM 40 MILLIGRAM(S): 20 TABLET, DELAYED RELEASE ORAL at 05:58

## 2022-04-22 RX ADMIN — Medication 1 APPLICATION(S): at 17:42

## 2022-04-22 RX ADMIN — PANTOPRAZOLE SODIUM 40 MILLIGRAM(S): 20 TABLET, DELAYED RELEASE ORAL at 17:49

## 2022-04-22 RX ADMIN — Medication 12.5 MILLIGRAM(S): at 05:54

## 2022-04-22 RX ADMIN — Medication 1 APPLICATION(S): at 06:08

## 2022-04-22 RX ADMIN — Medication 75 MICROGRAM(S): at 05:54

## 2022-04-22 RX ADMIN — LISINOPRIL 10 MILLIGRAM(S): 2.5 TABLET ORAL at 05:56

## 2022-04-22 NOTE — PRE-ANESTHESIA EVALUATION ADULT - NSANTHOSAYNRD_GEN_A_CORE
denies per daughter/No. LAUREANO screening performed.  STOP BANG Legend: 0-2 = LOW Risk; 3-4 = INTERMEDIATE Risk; 5-8 = HIGH Risk

## 2022-04-22 NOTE — PRE-ANESTHESIA EVALUATION ADULT - NSANTHPMHFT_GEN_ALL_CORE
89 yr old female w afib w rvr  was started on eloquis, developed drop in h and h, eloquis held,  now for egd  BMI 50  DNR 89 yr old female w afib w rvr  was started on Eliquis, developed drop in h and h, Eliquis held,  now for egd  BMI 50  DNR

## 2022-04-22 NOTE — PRE-ANESTHESIA EVALUATION ADULT - NSANTHADDINFOFT_GEN_ALL_CORE
risks, benefits, alternatives, general anesthesia as a backup discussed with the patient and her daughter (as patient has dementia) via telephone and they agree to proceed as planned, patient seen and consent obtained prior to anesthetic

## 2022-04-22 NOTE — PROGRESS NOTE ADULT - ASSESSMENT
89 year old Female with a past medical history of Hypertension, Hyperlipidemia, Hypothyroidism, Dementia presented to the ER with a chief complaint of recurrent falls and last one being this am. Patient is being admitted to medicine for recurrent falls.     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  PLAN  - Physiatry consult and Physical Therapy consult  - Ambulate with Assistance;  Fall Risk   - Case management /  consult - Daughter wants STR     # Acute anemia and coffee ground emesis, r/o GIB  # Right Upper Quadrant & Epigastric Abdominal Pain   - CT abd/pelvis: No evidence of acute abdominopelvic pathology on this unenhanced exam. Specifically no evidence of intra-abdominal or retroperitoneal   hemorrhage.  - s/p 1 U pRBC 4/20  PLAN  - GI on board  - CT abdomen and pelvis no contrast r/o retroperitoneal bleed  - Plan for EGD today  - NPO since MN  - PPI IV BID  - Monitor CBC  - Transfuse prn to hgb >8  - Two large bore IV lines    # Paroxysmal Atrial Fibrillation with RVR  - HR 120s in ER   - [id number]: 4 Points   - HAS BLED score: 3(5.8% risk for bleeding)  - ECHO: EF 60-65%;  Mildly enlarged left atrium. Mild mitral valve regurgitation. There is mild aortic root calcification. There is no evidence of pericardial effusion.  - patient was started on Eliquis on 4/19 and had episode of coffee ground emesis yesterday and Eliquis was stopped   PLAN  - Cardio on board  - cont metoprolol 12.5mg BID and Procardia 60mg PO daily  - Given Hx of frequent falls risk for bleeding slightly higher than risk for stroke on being on AC; Cardio discussed with Pt's daughter at bedside and aware of risks vs benefits of AC    # Progressional Dementia   # Recent Urinary Tract Infection   - Patient with recent urinary tract infection S/P Cefdinir   - Urinalysis   - Monitor Fevers     # Essential Hypertension    - Monitor Vital Signs    - DASH diet   - Continue with home medications (Nifedipine)   - Benazepril is non formulary, will order equivalent Lisinopril     # Mixed Hyperlipidemia  - DASH diet   - Continue with home medications (Atorvastatin)     # Hypothyroidism   - TSH: 0.11  - Continue with Synthroid   - f/u total T3 and free T4 level    # Code Status   - DNR / DNI   - MOLST Form Signed and Witnessed and in the Chart     Progress Note Handoff  Pending Consults: None  Pending Tests: EGD today  Pending Results: clinical improvement   Family Discussion: Patient and daughter  Disposition: Home___Xdc planning to STR within 24 hours, EGD pending__/SNF______/Other_____/Unknown at this time_____  Spent over 35 min reviewing chart and on coordinating patient care during interdisciplinary rounds

## 2022-04-23 ENCOUNTER — TRANSCRIPTION ENCOUNTER (OUTPATIENT)
Age: 87
End: 2022-04-23

## 2022-04-23 VITALS — SYSTOLIC BLOOD PRESSURE: 120 MMHG | HEART RATE: 65 BPM | DIASTOLIC BLOOD PRESSURE: 59 MMHG

## 2022-04-23 LAB
ALBUMIN SERPL ELPH-MCNC: 3.7 G/DL — SIGNIFICANT CHANGE UP (ref 3.5–5.2)
ALP SERPL-CCNC: 59 U/L — SIGNIFICANT CHANGE UP (ref 30–115)
ALT FLD-CCNC: 12 U/L — SIGNIFICANT CHANGE UP (ref 0–41)
ANION GAP SERPL CALC-SCNC: 13 MMOL/L — SIGNIFICANT CHANGE UP (ref 7–14)
AST SERPL-CCNC: 22 U/L — SIGNIFICANT CHANGE UP (ref 0–41)
BILIRUB SERPL-MCNC: 1.3 MG/DL — HIGH (ref 0.2–1.2)
BUN SERPL-MCNC: 12 MG/DL — SIGNIFICANT CHANGE UP (ref 10–20)
CALCIUM SERPL-MCNC: 9.7 MG/DL — SIGNIFICANT CHANGE UP (ref 8.5–10.1)
CHLORIDE SERPL-SCNC: 107 MMOL/L — SIGNIFICANT CHANGE UP (ref 98–110)
CO2 SERPL-SCNC: 22 MMOL/L — SIGNIFICANT CHANGE UP (ref 17–32)
CREAT SERPL-MCNC: 1 MG/DL — SIGNIFICANT CHANGE UP (ref 0.7–1.5)
EGFR: 54 ML/MIN/1.73M2 — LOW
GLUCOSE SERPL-MCNC: 104 MG/DL — HIGH (ref 70–99)
HCT VFR BLD CALC: 31.4 % — LOW (ref 37–47)
HGB BLD-MCNC: 10.3 G/DL — LOW (ref 12–16)
MCHC RBC-ENTMCNC: 29.4 PG — SIGNIFICANT CHANGE UP (ref 27–31)
MCHC RBC-ENTMCNC: 32.8 G/DL — SIGNIFICANT CHANGE UP (ref 32–37)
MCV RBC AUTO: 89.7 FL — SIGNIFICANT CHANGE UP (ref 81–99)
NRBC # BLD: 0 /100 WBCS — SIGNIFICANT CHANGE UP (ref 0–0)
PLATELET # BLD AUTO: 191 K/UL — SIGNIFICANT CHANGE UP (ref 130–400)
POTASSIUM SERPL-MCNC: 4.9 MMOL/L — SIGNIFICANT CHANGE UP (ref 3.5–5)
POTASSIUM SERPL-SCNC: 4.9 MMOL/L — SIGNIFICANT CHANGE UP (ref 3.5–5)
PROT SERPL-MCNC: 5.6 G/DL — LOW (ref 6–8)
RBC # BLD: 3.5 M/UL — LOW (ref 4.2–5.4)
RBC # FLD: 13.9 % — SIGNIFICANT CHANGE UP (ref 11.5–14.5)
SODIUM SERPL-SCNC: 142 MMOL/L — SIGNIFICANT CHANGE UP (ref 135–146)
WBC # BLD: 6.03 K/UL — SIGNIFICANT CHANGE UP (ref 4.8–10.8)
WBC # FLD AUTO: 6.03 K/UL — SIGNIFICANT CHANGE UP (ref 4.8–10.8)

## 2022-04-23 PROCEDURE — 99239 HOSP IP/OBS DSCHRG MGMT >30: CPT

## 2022-04-23 RX ORDER — PANTOPRAZOLE SODIUM 20 MG/1
40 TABLET, DELAYED RELEASE ORAL
Refills: 0 | Status: DISCONTINUED | OUTPATIENT
Start: 2022-04-23 | End: 2022-04-23

## 2022-04-23 RX ORDER — PANTOPRAZOLE SODIUM 20 MG/1
1 TABLET, DELAYED RELEASE ORAL
Qty: 60 | Refills: 0
Start: 2022-04-23 | End: 2022-05-22

## 2022-04-23 RX ORDER — METOPROLOL TARTRATE 50 MG
0.5 TABLET ORAL
Qty: 0 | Refills: 0 | DISCHARGE

## 2022-04-23 RX ADMIN — Medication 1 APPLICATION(S): at 18:40

## 2022-04-23 RX ADMIN — PANTOPRAZOLE SODIUM 40 MILLIGRAM(S): 20 TABLET, DELAYED RELEASE ORAL at 18:54

## 2022-04-23 RX ADMIN — Medication 1 APPLICATION(S): at 06:15

## 2022-04-23 RX ADMIN — LISINOPRIL 10 MILLIGRAM(S): 2.5 TABLET ORAL at 06:15

## 2022-04-23 RX ADMIN — Medication 60 MILLIGRAM(S): at 06:16

## 2022-04-23 RX ADMIN — Medication 12.5 MILLIGRAM(S): at 18:54

## 2022-04-23 RX ADMIN — Medication 75 MICROGRAM(S): at 06:15

## 2022-04-23 RX ADMIN — Medication 12.5 MILLIGRAM(S): at 06:15

## 2022-04-23 RX ADMIN — PANTOPRAZOLE SODIUM 40 MILLIGRAM(S): 20 TABLET, DELAYED RELEASE ORAL at 06:16

## 2022-04-23 NOTE — DISCHARGE NOTE PROVIDER - HOSPITAL COURSE
A 89 year old Female with a past medical history of Hypertension, Hyperlipidemia, Hypothyroidism, Dementia presented to the ER with a chief complaint of recurrent falls and last one being this am.   PT with recurrent Mechanical Falls secondary to Muscular Deconditioning . Dementia . CT head  No acute intracranial hemorrhage. Pt seen by physiatry  and Physical Therapy pt to  go short term rehab.   Pt with acute anemia and coffee ground emesis, r/o GIB. Pt had CT abd/pelvis: No evidence of acute abdominopelvic pathology on this unenhanced exam.  Pt s/p 1 U pRBC seen by GI, s/p EGD showed  esophagitis.   PT tolerating diet, denies bloody bowel movement. Pt with paroxysmal Atrial Fibrillation with RVR, Eliquis stopped.  Given Hx of frequent falls risk for bleeding slightly higher than risk for stroke on being on AC; Cardio discussed with Pt's daughter at bedside and aware of risks vs benefits of AC  Pt with hx of  Hypothyroidism to follow up with pcp as outpt.      A 89 year old Female with a past medical history of Hypertension, Hyperlipidemia, Hypothyroidism, Dementia presented to the ER with a chief complaint of recurrent falls and last one being this am.   PT with recurrent Mechanical Falls secondary to Muscular Deconditioning . Dementia . CT head  No acute intracranial hemorrhage. Pt seen by physiatry  and Physical Therapy pt to  go short term rehab.   Pt with acute anemia and coffee ground emesis, r/o GIB. Pt had CT abd/pelvis: No evidence of acute abdominopelvic pathology on this unenhanced exam.  Pt s/p 1 U pRBC seen by GI, s/p EGD showed  esophagitis.   PT tolerating diet, denies bloody bowel movement. Pt with paroxysmal Atrial Fibrillation with RVR, Eliquis stopped.  Given Hx of frequent falls risk for bleeding slightly higher than risk for stroke on being on AC; Cardio discussed with Pt's daughter at bedside and aware of risks vs benefits of AC  Pt with hx of  Hypothyroidism to follow up with pcp as outpt.

## 2022-04-23 NOTE — DISCHARGE NOTE PROVIDER - NSDCMRMEDTOKEN_GEN_ALL_CORE_FT
ATORVASTATIN 10MG TABLETS: 1 tab(s) orally once a day (at bedtime)  benazepril 10 mg oral tablet: 1 tab(s) orally once a day  LEVOTHYROXINE 0.075MG (75MCG) TABS: 1 tab(s) orally once a day  Nifedical XL 60 mg oral tablet, extended release: 1 tab(s) orally once a day  Protonix 40 mg oral delayed release tablet: 1 tab(s) orally once a day    ATORVASTATIN 10MG TABLETS: 1 tab(s) orally once a day (at bedtime)  benazepril 10 mg oral tablet: 1 tab(s) orally once a day  LEVOTHYROXINE 0.075MG (75MCG) TABS: 1 tab(s) orally once a day  Metoprolol Tartrate 25 mg oral tablet: 0.5 tab(s) orally 2 times a day  Nifedical XL 60 mg oral tablet, extended release: 1 tab(s) orally once a day  Protonix 40 mg oral delayed release tablet: 1 tab(s) orally 2 times a day

## 2022-04-23 NOTE — DISCHARGE NOTE PROVIDER - NSDCCPCAREPLAN_GEN_ALL_CORE_FT
PRINCIPAL DISCHARGE DIAGNOSIS  Diagnosis: Frequent falls  Assessment and Plan of Treatment: seen by physiatry and physical therapy will need short term rehab      SECONDARY DISCHARGE DIAGNOSES  Diagnosis: Hypothyroidism  Assessment and Plan of Treatment: -follow up with your PCP

## 2022-04-23 NOTE — DISCHARGE NOTE PROVIDER - PROVIDER TOKENS
PROVIDER:[TOKEN:[[id number]],FOLLOWUP:[1 week]] PROVIDER:[TOKEN:[[id number]],FOLLOWUP:[1 week]],PROVIDER:[TOKEN:[[id number]],FOLLOWUP:[2 weeks]]

## 2022-04-23 NOTE — DISCHARGE NOTE NURSING/CASE MANAGEMENT/SOCIAL WORK - PATIENT PORTAL LINK FT
You can access the FollowMyHealth Patient Portal offered by [facility] by registering at the following website: [email or web address]. By joining [employer]’s FollowMyHealth portal, you will also be able to view your health information using other applications (apps) compatible with our system.

## 2022-04-23 NOTE — DISCHARGE NOTE PROVIDER - CARE PROVIDERS DIRECT ADDRESSES
[email or web address] [email or web address] [email or web address]

## 2022-04-23 NOTE — DISCHARGE NOTE PROVIDER - CARE PROVIDER_API CALL
Jackelin Iniguez (DO)  Family Medicine  [address] [place] [address]  Phone: [phone]  Fax: [phone]  Follow Up Time: 1 week   Jackelin Iniguez (DO)  Family Medicine  [address] [place] [address]  Phone: [phone]  Fax: [phone]  Follow Up Time: 1 week    Cornelio Funk)  Gastroenterology; Internal Medicine  [address] [place] [address]  Phone: [phone]  Fax: [phone]  Follow Up Time: 2 weeks

## 2022-04-23 NOTE — PROGRESS NOTE ADULT - ASSESSMENT
89 year old Female with a past medical history of Hypertension, Hyperlipidemia, Hypothyroidism, Dementia presented to the ER with a chief complaint of recurrent falls and last one being this am. Patient is being admitted to medicine for recurrent falls.     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  PLAN  - Physiatry consult and Physical Therapy consult  - Ambulate with Assistance;  Fall Risk   - Discharge today to STR    # Acute anemia and coffee ground emesis, possibly UGIB- resolved  # Right Upper Quadrant & Epigastric Abdominal Pain   - CT abd/pelvis: No evidence of acute abdominopelvic pathology on this unenhanced exam. Specifically no evidence of intra-abdominal or retroperitoneal   hemorrhage.  - s/p 1 U pRBC 4/20  - CT abd: negative for blood  - 4/22: s/p EGD: esophagitis  PLAN  - GI on board  - PPI PO BID  - Monitor CBC  - Transfuse prn to hgb >8  - Two large bore IV lines    # Paroxysmal Atrial Fibrillation with RVR  - HR 120s in ER   - [id number]: 4 Points   - HAS BLED score: 3(5.8% risk for bleeding)  - ECHO: EF 60-65%;  Mildly enlarged left atrium. Mild mitral valve regurgitation. There is mild aortic root calcification. There is no evidence of pericardial effusion.  - patient was started on Eliquis on 4/19 and had episode of coffee ground emesis yesterday and Eliquis was stopped   PLAN  - Cardio on board  - cont metoprolol 12.5mg BID and Procardia 60mg PO daily  - Given Hx of frequent falls risk for bleeding slightly higher than risk for stroke on being on AC; Cardio discussed with Pt's daughter at bedside and aware of risks vs benefits of AC    # Progressional Dementia   # Recent Urinary Tract Infection   - Patient with recent urinary tract infection S/P Cefdinir   - Urinalysis   - Monitor Fevers     # Essential Hypertension    - Monitor Vital Signs    - DASH diet   - Continue with home medications (Nifedipine)   - Benazepril is non formulary, will order equivalent Lisinopril     # Mixed Hyperlipidemia  - DASH diet   - Continue with home medications (Atorvastatin)     # Hypothyroidism   - TSH: 0.11  - Continue with Synthroid   - outpatient f/u with PCP    # Code Status   - DNR / DNI   - MOLST Form Signed and Witnessed and in the Chart     Progress Note Handoff  Pending Consults: None  Pending Tests: none  Pending Results: none  Family Discussion: Patient and daughter  Disposition: Home___Xdc planning to STR for today__/SNF______/Other_____/Unknown at this time_____  Spent over 35 min reviewing chart and on coordinating patient care during interdisciplinary rounds

## 2022-04-23 NOTE — DISCHARGE NOTE NURSING/CASE MANAGEMENT/SOCIAL WORK - NSDCPEFALRISK_GEN_ALL_CORE
For information on Fall & Injury Prevention, visit: [email or web address] OR  [email or web address] OR  https://www.cdc.gov/steadi/patient.html no

## 2022-04-26 LAB — SURGICAL PATHOLOGY STUDY: SIGNIFICANT CHANGE UP

## 2022-05-03 NOTE — CDI QUERY NOTE - NSCDIOTHERTXTBX_GEN_ALL_CORE_HH
Clinical Indicators     4/19 [facility]- Physician Assistant: … # Recurrent Mechanical Falls  secondary to Muscular Deconditioning # Dementia; … # Progressional Dementia    4/23 Discharge Note Provider: 89-year-old Female with a past medical history of  Hypertension, Hyperlipidemia, Hypothyroidism, Dementia presented to the ER with  a chief complaint of recurrent falls; recurrent Mechanical Falls secondary to  Muscular Deconditioning. Dementia. CT head No acute intracranial hemorrhage. Pt  seen by physiatry and Physical Therapy pt. to go short term rehab. Pt with acute  anemia and coffee ground emesis,    Query     Based on your clinical judgment and consideration of these clinical indicators,   please clarify if Muscular deconditioning can be further specified as:   * Muscular deconditioning related to age and dementia evaluated and treated   * Other (please specify)   * Unable to clinically determine    Thank you,  Myriam Rodriguez  [phone]

## 2022-08-25 NOTE — PATIENT PROFILE ADULT - FUNCTIONAL ASSESSMENT - BASIC MOBILITY 3.
Addended by: Thaddeus Monreal on: 8/25/2022 02:36 PM     Modules accepted: Orders 3 = A little assistance

## 2024-04-01 PROBLEM — E78.5 HYPERLIPIDEMIA, UNSPECIFIED: Chronic | Status: ACTIVE | Noted: 2022-04-19

## 2024-04-01 PROBLEM — F03.90 UNSPECIFIED DEMENTIA, UNSPECIFIED SEVERITY, WITHOUT BEHAVIORAL DISTURBANCE, PSYCHOTIC DISTURBANCE, MOOD DISTURBANCE, AND ANXIETY: Chronic | Status: ACTIVE | Noted: 2022-04-19

## 2024-04-01 PROBLEM — F03.90 UNSPECIFIED DEMENTIA WITHOUT BEHAVIORAL DISTURBANCE: Chronic | Status: ACTIVE | Noted: 2022-04-19

## 2024-05-02 ENCOUNTER — APPOINTMENT (OUTPATIENT)
Dept: SPEECH THERAPY | Facility: CLINIC | Age: 89
End: 2024-05-02

## 2024-06-27 PROBLEM — Z00.00 ENCOUNTER FOR PREVENTIVE HEALTH EXAMINATION: Status: ACTIVE | Noted: 2024-06-27

## 2024-06-28 ENCOUNTER — APPOINTMENT (OUTPATIENT)
Dept: SPEECH THERAPY | Facility: CLINIC | Age: 89
End: 2024-06-28

## 2024-09-30 ENCOUNTER — EMERGENCY (EMERGENCY)
Facility: HOSPITAL | Age: 89
LOS: 0 days | Discharge: HOME | End: 2024-09-30
Attending: EMERGENCY MEDICINE
Payer: MEDICARE

## 2024-09-30 VITALS
HEART RATE: 86 BPM | TEMPERATURE: 98 F | OXYGEN SATURATION: 96 % | SYSTOLIC BLOOD PRESSURE: 112 MMHG | RESPIRATION RATE: 18 BRPM | DIASTOLIC BLOOD PRESSURE: 64 MMHG

## 2024-09-30 LAB
ALBUMIN SERPL ELPH-MCNC: 3.9 G/DL — SIGNIFICANT CHANGE UP (ref 3.5–5.2)
ALP SERPL-CCNC: 73 U/L — SIGNIFICANT CHANGE UP (ref 30–115)
ALT FLD-CCNC: 27 U/L — SIGNIFICANT CHANGE UP (ref 0–41)
ANION GAP SERPL CALC-SCNC: 16 MMOL/L — HIGH (ref 7–14)
APTT BLD: 26.1 SEC — LOW (ref 27–39.2)
AST SERPL-CCNC: 26 U/L — SIGNIFICANT CHANGE UP (ref 0–41)
BASOPHILS # BLD AUTO: 0.01 K/UL — SIGNIFICANT CHANGE UP (ref 0–0.2)
BASOPHILS NFR BLD AUTO: 0.1 % — SIGNIFICANT CHANGE UP (ref 0–1)
BILIRUB SERPL-MCNC: 1.3 MG/DL — HIGH (ref 0.2–1.2)
BUN SERPL-MCNC: 42 MG/DL — HIGH (ref 10–20)
CALCIUM SERPL-MCNC: 9.7 MG/DL — SIGNIFICANT CHANGE UP (ref 8.4–10.5)
CHLORIDE SERPL-SCNC: 105 MMOL/L — SIGNIFICANT CHANGE UP (ref 98–110)
CO2 SERPL-SCNC: 18 MMOL/L — SIGNIFICANT CHANGE UP (ref 17–32)
CREAT SERPL-MCNC: 1.5 MG/DL — SIGNIFICANT CHANGE UP (ref 0.7–1.5)
EGFR: 33 ML/MIN/1.73M2 — LOW
EOSINOPHIL # BLD AUTO: 0 K/UL — SIGNIFICANT CHANGE UP (ref 0–0.7)
EOSINOPHIL NFR BLD AUTO: 0 % — SIGNIFICANT CHANGE UP (ref 0–8)
GLUCOSE SERPL-MCNC: 194 MG/DL — HIGH (ref 70–99)
HCT VFR BLD CALC: 34.2 % — LOW (ref 37–47)
HGB BLD-MCNC: 11.1 G/DL — LOW (ref 12–16)
IMM GRANULOCYTES NFR BLD AUTO: 0.5 % — HIGH (ref 0.1–0.3)
INR BLD: 1.07 RATIO — SIGNIFICANT CHANGE UP (ref 0.65–1.3)
LACTATE SERPL-SCNC: 2.1 MMOL/L — HIGH (ref 0.7–2)
LIDOCAIN IGE QN: 50 U/L — SIGNIFICANT CHANGE UP (ref 7–60)
LYMPHOCYTES # BLD AUTO: 1.36 K/UL — SIGNIFICANT CHANGE UP (ref 1.2–3.4)
LYMPHOCYTES # BLD AUTO: 11.3 % — LOW (ref 20.5–51.1)
MCHC RBC-ENTMCNC: 32.3 PG — HIGH (ref 27–31)
MCHC RBC-ENTMCNC: 32.5 G/DL — SIGNIFICANT CHANGE UP (ref 32–37)
MCV RBC AUTO: 99.4 FL — HIGH (ref 81–99)
MONOCYTES # BLD AUTO: 1.2 K/UL — HIGH (ref 0.1–0.6)
MONOCYTES NFR BLD AUTO: 10 % — HIGH (ref 1.7–9.3)
NEUTROPHILS # BLD AUTO: 9.41 K/UL — HIGH (ref 1.4–6.5)
NEUTROPHILS NFR BLD AUTO: 78.1 % — HIGH (ref 42.2–75.2)
NRBC # BLD: 0 /100 WBCS — SIGNIFICANT CHANGE UP (ref 0–0)
PLATELET # BLD AUTO: 166 K/UL — SIGNIFICANT CHANGE UP (ref 130–400)
PMV BLD: 12.4 FL — HIGH (ref 7.4–10.4)
POTASSIUM SERPL-MCNC: 5.2 MMOL/L — HIGH (ref 3.5–5)
POTASSIUM SERPL-SCNC: 5.2 MMOL/L — HIGH (ref 3.5–5)
PROT SERPL-MCNC: 5.9 G/DL — LOW (ref 6–8)
PROTHROM AB SERPL-ACNC: 12.2 SEC — SIGNIFICANT CHANGE UP (ref 9.95–12.87)
RBC # BLD: 3.44 M/UL — LOW (ref 4.2–5.4)
RBC # FLD: 14.5 % — SIGNIFICANT CHANGE UP (ref 11.5–14.5)
SODIUM SERPL-SCNC: 139 MMOL/L — SIGNIFICANT CHANGE UP (ref 135–146)
WBC # BLD: 12.04 K/UL — HIGH (ref 4.8–10.8)
WBC # FLD AUTO: 12.04 K/UL — HIGH (ref 4.8–10.8)

## 2024-09-30 PROCEDURE — 85730 THROMBOPLASTIN TIME PARTIAL: CPT

## 2024-09-30 PROCEDURE — 99285 EMERGENCY DEPT VISIT HI MDM: CPT | Mod: 25

## 2024-09-30 PROCEDURE — 93005 ELECTROCARDIOGRAM TRACING: CPT

## 2024-09-30 PROCEDURE — 85610 PROTHROMBIN TIME: CPT

## 2024-09-30 PROCEDURE — 99285 EMERGENCY DEPT VISIT HI MDM: CPT | Mod: GC

## 2024-09-30 PROCEDURE — 71260 CT THORAX DX C+: CPT | Mod: MC

## 2024-09-30 PROCEDURE — 70450 CT HEAD/BRAIN W/O DYE: CPT | Mod: 26,MC

## 2024-09-30 PROCEDURE — 80053 COMPREHEN METABOLIC PANEL: CPT

## 2024-09-30 PROCEDURE — 71045 X-RAY EXAM CHEST 1 VIEW: CPT | Mod: 26

## 2024-09-30 PROCEDURE — 74177 CT ABD & PELVIS W/CONTRAST: CPT | Mod: MC

## 2024-09-30 PROCEDURE — 70450 CT HEAD/BRAIN W/O DYE: CPT | Mod: MC

## 2024-09-30 PROCEDURE — 72170 X-RAY EXAM OF PELVIS: CPT | Mod: 26

## 2024-09-30 PROCEDURE — 72170 X-RAY EXAM OF PELVIS: CPT

## 2024-09-30 PROCEDURE — 36415 COLL VENOUS BLD VENIPUNCTURE: CPT

## 2024-09-30 PROCEDURE — 72125 CT NECK SPINE W/O DYE: CPT | Mod: MC

## 2024-09-30 PROCEDURE — 85025 COMPLETE CBC W/AUTO DIFF WBC: CPT

## 2024-09-30 PROCEDURE — 83605 ASSAY OF LACTIC ACID: CPT

## 2024-09-30 PROCEDURE — 93010 ELECTROCARDIOGRAM REPORT: CPT

## 2024-09-30 PROCEDURE — 71260 CT THORAX DX C+: CPT | Mod: 26,MC

## 2024-09-30 PROCEDURE — 83690 ASSAY OF LIPASE: CPT

## 2024-09-30 PROCEDURE — 74177 CT ABD & PELVIS W/CONTRAST: CPT | Mod: 26,MC

## 2024-09-30 PROCEDURE — 71045 X-RAY EXAM CHEST 1 VIEW: CPT

## 2024-09-30 PROCEDURE — 36000 PLACE NEEDLE IN VEIN: CPT

## 2024-09-30 PROCEDURE — 72125 CT NECK SPINE W/O DYE: CPT | Mod: 26,MC

## 2024-09-30 RX ORDER — SODIUM CHLORIDE 9 MG/ML
250 INJECTION INTRAMUSCULAR; INTRAVENOUS; SUBCUTANEOUS ONCE
Refills: 0 | Status: COMPLETED | OUTPATIENT
Start: 2024-09-30 | End: 2024-09-30

## 2024-09-30 RX ADMIN — SODIUM CHLORIDE 250 MILLILITER(S): 9 INJECTION INTRAMUSCULAR; INTRAVENOUS; SUBCUTANEOUS at 17:00

## 2024-09-30 NOTE — ED PROVIDER NOTE - PROGRESS NOTE DETAILS
kz: attempted to call [facility] with no success. Authored by Dr. Pulido: Spoke with daughter and updated her about the incidental findings.  Patient to be DC back to nursing home.  Daughter aware of plan and agreeable.

## 2024-09-30 NOTE — ED PROVIDER NOTE - OBJECTIVE STATEMENT
Patient is a 89-year-old female past medical history of hypertension, hyperlipidemia, anemia, dementia, hypothyroid presenting to the ED after an unwitnessed fall.  Per EMS patient was at her nursing home when she had a fall.  Patient was doing okay but then later had 2 episodes of vomiting and was sent into the ED for further workup.  No AC use.  Unknown LOC, unknown Ht.  Patient currently is unable to contribute to her history per her baseline dementia and does not remember what happened.

## 2024-09-30 NOTE — ED PROVIDER NOTE - PHYSICAL EXAMINATION
CONSTITUTIONAL: NAD  SKIN: Warm dry, normal skin turgor  HEAD: NCAT  EYES: EOMI, PERRLA, no scleral icterus, conjunctiva pink  ENT: normal pharynx with no erythema or exudates  NECK: Supple; non tender. Full ROM.  CARD: RRR  RESP: clear to ausculation b/l. No crackles or wheezing.  ABD: soft, non-tender, non-distended, no rebound or guarding.  EXT: Full ROM, no bony tenderness, no pedal edema, no calf tenderness  NEURO: normal motor. normal sensory. At baseline

## 2024-09-30 NOTE — ED PROVIDER NOTE - CLINICAL SUMMARY MEDICAL DECISION MAKING FREE TEXT BOX
Patient presents after fall.  Labs and imaging done.  No traumatic injuries.  All results discussed with daughter who is bedside.  Aware of incidental findings.  Patient discharged back to nursing home facility.

## 2024-09-30 NOTE — ED PROVIDER NOTE - NSFOLLOWUPINSTRUCTIONS_ED_ALL_ED_FT
Please follow up with your primary care physician.     Closed Head Injury    Closed head injury in an injury to your head that may or may not involve a traumatic brain injury (TBI). Symptoms of TBI can be short or long lasting and include headache, dizziness, interference with memory or speech, fatigue, confusion, changes in sleep, mood changes, nausea, depression/anxiety, and dulling of senses. Make sure to obtain proper rest which includes getting plenty of sleep, avoiding excessive visual stimulation, and avoiding activities that may cause physical or mental stress. Avoid any situation where there is potential for another head injury including sports.    SEEK MEDICAL CARE IF YOU HAVE THE FOLLOWING SYMPTOMS: unusual drowsiness, vomiting, severe dizziness, seizures, lightheadedness, muscular weakness, different pupil sizes, visual changes, or clear or bloody discharge from your ears or nose.

## 2024-09-30 NOTE — ED PROVIDER NOTE - PATIENT PORTAL LINK FT
You can access the FollowMyHealth Patient Portal offered by [facility] by registering at the following website: [email or web address]. By joining [employer]’s FollowMyHealth portal, you will also be able to view your health information using other applications (apps) compatible with our system.

## 2024-09-30 NOTE — ED ADULT NURSE NOTE - NSFALLRISKINTERV_ED_ALL_ED
Assistance OOB with selected safe patient handling equipment if applicable/Assistance with ambulation/Communicate fall risk and risk factors to all staff, patient, and family/Monitor for mental status changes and reorient to person, place, and time, as needed/Move patient closer to nursing station/within visual sight of ED staff/Provide visual cue: yellow wristband, yellow gown, etc/Reinforce activity limits and safety measures with patient and family/Toileting schedule using arm’s reach rule for commode and bathroom/Use of alarms - bed, stretcher, chair and/or video monitoring/Call bell, personal items and telephone in reach/Instruct patient to call for assistance before getting out of bed/chair/stretcher/Non-slip footwear applied when patient is off stretcher/[place] to call system/Physically safe environment - no spills, clutter or unnecessary equipment/Purposeful Proactive Rounding/Room/bathroom lighting operational, light cord in reach

## 2024-09-30 NOTE — ED PROVIDER NOTE - CARE PROVIDER_API CALL
Nelia [address] [address]  Phone: [phone]  Fax: [phone]  Follow Up Time: 1-3 Days

## 2024-09-30 NOTE — ED PROVIDER NOTE - ATTENDING CONTRIBUTION TO CARE
89 yo F pmh of HTN, HLD, anemia, dementia, hypothyroid presents after an unwitnessed fall. Was at a nursing home when she had the fall. No signs of trauma. During the day patient had 2 episodes of vomiting so sent to the ED. no AC. no other injuries. Patient unable to provide history.     CONSTITUTIONAL: Well-developed; well-nourished; in no acute distress.   SKIN: warm, dry  HEAD: Normocephalic; atraumatic.  EYES: PERRL, EOMI, no conjunctival erythema  ENT: No nasal discharge; airway clear.  NECK: Supple; non tender.  CARD: S1, S2 normal;  Regular rate and rhythm.   RESP: No wheezes, rales or rhonchi.  ABD: soft non tender, non distended, no rebound or guarding  EXT: Normal ROM.  5/5 strength in all 4 extremities   LYMPH: No acute cervical adenopathy.  NEURO: A&Ox1. neurovascularly intact. moving all extremities. following commands.   PSYCH: Cooperative, appropriate.

## 2024-09-30 NOTE — ED ADULT NURSE NOTE - OBJECTIVE STATEMENT
S/p fall at nursing home this morning. Unsure of head injury. not on AC. Pt states everything hurts, disoriented to time/place/situation

## 2024-10-05 DIAGNOSIS — E78.5 HYPERLIPIDEMIA, UNSPECIFIED: ICD-10-CM

## 2024-10-05 DIAGNOSIS — R11.10 VOMITING, UNSPECIFIED: ICD-10-CM

## 2024-10-05 DIAGNOSIS — F03.90 UNSPECIFIED DEMENTIA, UNSPECIFIED SEVERITY, WITHOUT BEHAVIORAL DISTURBANCE, PSYCHOTIC DISTURBANCE, MOOD DISTURBANCE, AND ANXIETY: ICD-10-CM

## 2024-10-05 DIAGNOSIS — I10 ESSENTIAL (PRIMARY) HYPERTENSION: ICD-10-CM
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
## 2024-10-05 DIAGNOSIS — Z04.3 ENCOUNTER FOR EXAMINATION AND OBSERVATION FOLLOWING OTHER ACCIDENT: ICD-10-CM
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
## 2024-10-05 DIAGNOSIS — Z86.2 PERSONAL HISTORY OF DISEASES OF THE BLOOD AND BLOOD-FORMING ORGANS AND CERTAIN DISORDERS INVOLVING THE IMMUNE MECHANISM: ICD-10-CM

## 2024-10-05 DIAGNOSIS — E03.9 HYPOTHYROIDISM, UNSPECIFIED: ICD-10-CM

## 2024-10-11 ENCOUNTER — INPATIENT (INPATIENT)
Facility: HOSPITAL | Age: 89
LOS: 1 days | Discharge: HOME | DRG: 392 | End: 2024-10-13
Attending: STUDENT IN AN ORGANIZED HEALTH CARE EDUCATION/TRAINING PROGRAM | Admitting: HOSPITALIST
Payer: MEDICARE

## 2024-10-11 VITALS
DIASTOLIC BLOOD PRESSURE: 71 MMHG | TEMPERATURE: 98 F | OXYGEN SATURATION: 96 % | SYSTOLIC BLOOD PRESSURE: 112 MMHG | RESPIRATION RATE: 16 BRPM | HEART RATE: 71 BPM

## 2024-10-11 DIAGNOSIS — R11.2 NAUSEA WITH VOMITING, UNSPECIFIED: ICD-10-CM

## 2024-10-11 LAB
ALBUMIN SERPL ELPH-MCNC: 3.7 G/DL — SIGNIFICANT CHANGE UP (ref 3.5–5.2)
ALP SERPL-CCNC: 82 U/L — SIGNIFICANT CHANGE UP (ref 30–115)
ALT FLD-CCNC: 18 U/L — SIGNIFICANT CHANGE UP (ref 0–41)
ANION GAP SERPL CALC-SCNC: 11 MMOL/L — SIGNIFICANT CHANGE UP (ref 7–14)
APPEARANCE UR: CLEAR — SIGNIFICANT CHANGE UP
AST SERPL-CCNC: 20 U/L — SIGNIFICANT CHANGE UP (ref 0–41)
BASE EXCESS BLDV CALC-SCNC: -1.2 MMOL/L — SIGNIFICANT CHANGE UP (ref -2–3)
BASOPHILS # BLD AUTO: 0.01 K/UL — SIGNIFICANT CHANGE UP (ref 0–0.2)
BASOPHILS NFR BLD AUTO: 0.2 % — SIGNIFICANT CHANGE UP (ref 0–1)
BILIRUB SERPL-MCNC: 0.8 MG/DL — SIGNIFICANT CHANGE UP (ref 0.2–1.2)
BILIRUB UR-MCNC: NEGATIVE — SIGNIFICANT CHANGE UP
BUN SERPL-MCNC: 40 MG/DL — HIGH (ref 10–20)
CA-I SERPL-SCNC: 1.39 MMOL/L — HIGH (ref 1.15–1.33)
CALCIUM SERPL-MCNC: 10.2 MG/DL — SIGNIFICANT CHANGE UP (ref 8.4–10.5)
CHLORIDE SERPL-SCNC: 111 MMOL/L — HIGH (ref 98–110)
CO2 SERPL-SCNC: 24 MMOL/L — SIGNIFICANT CHANGE UP (ref 17–32)
COLOR SPEC: YELLOW — SIGNIFICANT CHANGE UP
CREAT SERPL-MCNC: 1.6 MG/DL — HIGH (ref 0.7–1.5)
DIFF PNL FLD: NEGATIVE — SIGNIFICANT CHANGE UP
EGFR: 30 ML/MIN/1.73M2 — LOW
EOSINOPHIL # BLD AUTO: 0.04 K/UL — SIGNIFICANT CHANGE UP (ref 0–0.7)
EOSINOPHIL NFR BLD AUTO: 0.6 % — SIGNIFICANT CHANGE UP (ref 0–8)
GAS PNL BLDV: 141 MMOL/L — SIGNIFICANT CHANGE UP (ref 136–145)
GAS PNL BLDV: SIGNIFICANT CHANGE UP
GLUCOSE SERPL-MCNC: 103 MG/DL — HIGH (ref 70–99)
GLUCOSE UR QL: NEGATIVE MG/DL — SIGNIFICANT CHANGE UP
HCO3 BLDV-SCNC: 25 MMOL/L — SIGNIFICANT CHANGE UP (ref 22–29)
HCT VFR BLD CALC: 33.5 % — LOW (ref 37–47)
HCT VFR BLDA CALC: 33 % — LOW (ref 34.5–46.5)
HGB BLD CALC-MCNC: 11.1 G/DL — LOW (ref 11.7–16.1)
HGB BLD-MCNC: 10.6 G/DL — LOW (ref 12–16)
IMM GRANULOCYTES NFR BLD AUTO: 0.2 % — SIGNIFICANT CHANGE UP (ref 0.1–0.3)
KETONES UR-MCNC: ABNORMAL MG/DL
LACTATE BLDV-MCNC: 0.9 MMOL/L — SIGNIFICANT CHANGE UP (ref 0.5–2)
LACTATE SERPL-SCNC: 0.9 MMOL/L — SIGNIFICANT CHANGE UP (ref 0.7–2)
LEUKOCYTE ESTERASE UR-ACNC: NEGATIVE — SIGNIFICANT CHANGE UP
LIDOCAIN IGE QN: 34 U/L — SIGNIFICANT CHANGE UP (ref 7–60)
LYMPHOCYTES # BLD AUTO: 1.61 K/UL — SIGNIFICANT CHANGE UP (ref 1.2–3.4)
LYMPHOCYTES # BLD AUTO: 26.1 % — SIGNIFICANT CHANGE UP (ref 20.5–51.1)
MAGNESIUM SERPL-MCNC: 2.6 MG/DL — HIGH (ref 1.8–2.4)
MCHC RBC-ENTMCNC: 31.6 G/DL — LOW (ref 32–37)
MCHC RBC-ENTMCNC: 32.1 PG — HIGH (ref 27–31)
MCV RBC AUTO: 101.5 FL — HIGH (ref 81–99)
MONOCYTES # BLD AUTO: 0.73 K/UL — HIGH (ref 0.1–0.6)
MONOCYTES NFR BLD AUTO: 11.8 % — HIGH (ref 1.7–9.3)
NEUTROPHILS # BLD AUTO: 3.77 K/UL — SIGNIFICANT CHANGE UP (ref 1.4–6.5)
NEUTROPHILS NFR BLD AUTO: 61.1 % — SIGNIFICANT CHANGE UP (ref 42.2–75.2)
NITRITE UR-MCNC: NEGATIVE — SIGNIFICANT CHANGE UP
NRBC # BLD: 0 /100 WBCS — SIGNIFICANT CHANGE UP (ref 0–0)
PCO2 BLDV: 46 MMHG — HIGH (ref 39–42)
PH BLDV: 7.34 — SIGNIFICANT CHANGE UP (ref 7.32–7.43)
PH UR: 5.5 — SIGNIFICANT CHANGE UP (ref 5–8)
PLATELET # BLD AUTO: 151 K/UL — SIGNIFICANT CHANGE UP (ref 130–400)
PMV BLD: 12.1 FL — HIGH (ref 7.4–10.4)
PO2 BLDV: 29 MMHG — SIGNIFICANT CHANGE UP (ref 25–45)
POTASSIUM BLDV-SCNC: 5.2 MMOL/L — HIGH (ref 3.5–5.1)
POTASSIUM SERPL-MCNC: 5.1 MMOL/L — HIGH (ref 3.5–5)
POTASSIUM SERPL-SCNC: 5.1 MMOL/L — HIGH (ref 3.5–5)
PROT SERPL-MCNC: 5.8 G/DL — LOW (ref 6–8)
PROT UR-MCNC: NEGATIVE MG/DL — SIGNIFICANT CHANGE UP
RBC # BLD: 3.3 M/UL — LOW (ref 4.2–5.4)
RBC # FLD: 14.8 % — HIGH (ref 11.5–14.5)
SAO2 % BLDV: 40.3 % — LOW (ref 67–88)
SODIUM SERPL-SCNC: 146 MMOL/L — SIGNIFICANT CHANGE UP (ref 135–146)
SP GR SPEC: 1.02 — SIGNIFICANT CHANGE UP (ref 1–1.03)
UROBILINOGEN FLD QL: 0.2 MG/DL — SIGNIFICANT CHANGE UP (ref 0.2–1)
WBC # BLD: 6.17 K/UL — SIGNIFICANT CHANGE UP (ref 4.8–10.8)
WBC # FLD AUTO: 6.17 K/UL — SIGNIFICANT CHANGE UP (ref 4.8–10.8)

## 2024-10-11 PROCEDURE — 86900 BLOOD TYPING SEROLOGIC ABO: CPT

## 2024-10-11 PROCEDURE — 80053 COMPREHEN METABOLIC PANEL: CPT

## 2024-10-11 PROCEDURE — 86901 BLOOD TYPING SEROLOGIC RH(D): CPT

## 2024-10-11 PROCEDURE — 82607 VITAMIN B-12: CPT

## 2024-10-11 PROCEDURE — 84100 ASSAY OF PHOSPHORUS: CPT

## 2024-10-11 PROCEDURE — 36415 COLL VENOUS BLD VENIPUNCTURE: CPT

## 2024-10-11 PROCEDURE — 97162 PT EVAL MOD COMPLEX 30 MIN: CPT | Mod: GP

## 2024-10-11 PROCEDURE — 92610 EVALUATE SWALLOWING FUNCTION: CPT | Mod: GN

## 2024-10-11 PROCEDURE — 85025 COMPLETE CBC W/AUTO DIFF WBC: CPT

## 2024-10-11 PROCEDURE — 99285 EMERGENCY DEPT VISIT HI MDM: CPT | Mod: FS

## 2024-10-11 PROCEDURE — 70450 CT HEAD/BRAIN W/O DYE: CPT | Mod: 26,MC

## 2024-10-11 PROCEDURE — 86850 RBC ANTIBODY SCREEN: CPT

## 2024-10-11 PROCEDURE — 85027 COMPLETE CBC AUTOMATED: CPT

## 2024-10-11 PROCEDURE — 82746 ASSAY OF FOLIC ACID SERUM: CPT

## 2024-10-11 PROCEDURE — 74177 CT ABD & PELVIS W/CONTRAST: CPT | Mod: 26,MC

## 2024-10-11 PROCEDURE — 80048 BASIC METABOLIC PNL TOTAL CA: CPT

## 2024-10-11 PROCEDURE — 83735 ASSAY OF MAGNESIUM: CPT

## 2024-10-11 PROCEDURE — 99222 1ST HOSP IP/OBS MODERATE 55: CPT

## 2024-10-11 RX ORDER — SODIUM CHLORIDE IRRIG SOLUTION 0.9 %
1000 SOLUTION, IRRIGATION IRRIGATION
Refills: 0 | Status: DISCONTINUED | OUTPATIENT
Start: 2024-10-11 | End: 2024-10-12

## 2024-10-11 RX ORDER — ONDANSETRON HCL/PF 4 MG/2 ML
4 VIAL (ML) INJECTION ONCE
Refills: 0 | Status: COMPLETED | OUTPATIENT
Start: 2024-10-11 | End: 2024-10-11

## 2024-10-11 RX ORDER — SODIUM CHLORIDE IRRIG SOLUTION 0.9 %
1000 SOLUTION, IRRIGATION IRRIGATION ONCE
Refills: 0 | Status: COMPLETED | OUTPATIENT
Start: 2024-10-11 | End: 2024-10-11

## 2024-10-11 RX ADMIN — Medication 1000 MILLILITER(S): at 17:10

## 2024-10-11 RX ADMIN — Medication 4 MILLIGRAM(S): at 17:17

## 2024-10-11 NOTE — ED PROVIDER NOTE - OBJECTIVE STATEMENT
89 yo F with PMHx of HTN, HLD, hypothyroidism, dementia and anemia presents to the ED from [facility] for generalized weakness, difficulty ambulating, and persisting nausea x  1 week. Pt has had minimal PO intake during this time. At baseline pt ambulates with walker but today she was unable to stand prompting daughter to send pt to ED. Pt currently denies complaints but is poor historian.

## 2024-10-11 NOTE — H&P ADULT - NSHPPHYSICALEXAM_GEN_ALL_CORE
GENERAL: NAD, lying in bed comfortably  HEAD:  Atraumatic, normocephalic  EYES: EOMI, PERRL  ENT: Supple, trachea midline, dry MM,  no nasal discharge  HEART: Regular rate and rhythm  LUNGS: Unlabored respirations.  Clear to auscultation bilaterally, no crackles, wheezing, or rhonchi  ABDOMEN: Soft, nontender, nondistended, +BS  EXTREMITIES: 2+ peripheral pulses bilaterally. No clubbing, cyanosis, or edema  NERVOUS SYSTEM:  A&Ox2, moving all extremities, no focal deficits

## 2024-10-11 NOTE — ED ADULT NURSE NOTE - OBJECTIVE STATEMENT
History obtained from daughter at the bedside Debi. As per daughter patient is weak, unable to ambulate, not making urine. No wet diaper since am 10/11. Patient has low appetite and not acting herself.

## 2024-10-11 NOTE — ED ADULT TRIAGE NOTE - CHIEF COMPLAINT QUOTE
Patient REYES from [facility] for nausea per [facility] home staff. Patient has no complaints at this time. AxO1 at baseline.

## 2024-10-11 NOTE — H&P ADULT - NSHPREVIEWOFSYSTEMS_GEN_ALL_CORE
REVIEW OF SYSTEMS:    CONSTITUTIONAL:  No weakness, fevers or chills  EYES/ENT:  No visual changes;  No vertigo or throat pain   NECK:  No pain or stiffness  RESPIRATORY:  No cough, wheezing, hemoptysis; No shortness of breath  CARDIOVASCULAR:  No chest pain or palpitations  GASTROINTESTINAL:  No abdominal or epigastric pain. + nausea, no vomiting, or hematemesis; No diarrhea or constipation. No melena or hematochezia.  GENITOURINARY:  No dysuria, frequency or hematuria  NEUROLOGICAL:  + generalized weakness, no numbness  SKIN:  No itching, rashes

## 2024-10-11 NOTE — ED PROVIDER NOTE - CARE PLAN
1 Principal Discharge DX:	Adult failure to thrive  Secondary Diagnosis:	Nausea & vomiting  Secondary Diagnosis:	Unable to walk

## 2024-10-11 NOTE — H&P ADULT - ASSESSMENT
89 y/o F with PMHx of HTN, HLD, hypothyroidism, dementia and anemia presents to the ED from [facility] for generalized weakness, difficulty ambulating, and persisting nausea x  1 week. Pt has had minimal PO intake during this time. At baseline pt ambulates with walker but today she was unable to stand prompting daughter to send pt to ED.       #Persistent nausea  #Poor oral intake / Failure to thrive / weakness  - Nausea x 1 week  - CTAP 10/11: No CT evidence of acute intra-abdominal pathology. Left adnexal cyst measuring approximately 2.6 cm.   - Nutrition consult  - speech and swallow consult  - hydration  - ensure max  - Check B12, folate, TSH    #Dementia  - AAOx1-2 (At baseline)  - CT Head 10/11: No acute intracranial pathology. Stable mild-moderate chronic microvascular ischemic changes.    # Essential Hypertension    - /71 (on admission)   - c/w home meds Nifedipine 60mg qD, Metoprolol 12.5 mg BID  - Benazepril 10mg is non formulary, will order equivalent Lisinopril 10mg (hold for now, K+ 5.1)    # Mixed Hyperlipidemia  - c/w home meds Atorvastatin 10mg qD  - Lipid panel in AM     # Hypothyroidism   - Continue with Synthroid   - Check TSH, total T3, T4 level in AM       #Diet: DASH (Ensure)  #GI ppx: pantoprazole  #Activity: IAT  #Code: DNR / DNI (MOLST PRIOR)    Pending:  - Nutrition eval  - Speech and swallow  - F/u B12, folate, TSH,  total T3, T4, lipid panel   89 y/o F with PMHx of HTN, HLD, hypothyroidism, dementia and anemia presents [facility] ED from [facility] for generalized weakness, difficulty ambulating, and persisting nausea x  1 week. Pt has had minimal PO intake during this time. At baseline pt ambulates with walker but today she was unable to stand prompting daughter to send pt to ED.       #Persistent nausea  #Poor oral intake / Failure to thrive / weakness  - Nausea x 1 week  - CTAP 10/11: No CT evidence of acute intra-abdominal pathology. Left adnexal cyst measuring approximately 2.6 cm.   - Nutrition & Dietician consult  - speech and swallow consult  - Gentle hydration  - Zofran PRN  - Monitor and replete electrolytes prn   - Fall precaution   - PT consult    #Dementia  - AAOx1-2 (At baseline)  - CT Head 10/11: No acute intracranial pathology. Stable mild-moderate chronic microvascular ischemic changes.    #Anemia  - Hgb 10.6 (baseline ~10-11)  - Check B12, folate  - Monitor CBC    # Essential Hypertension    - /71 (on admission)   - c/w home meds Nifedipine 60mg qD, Metoprolol 12.5 mg BID  - Benazepril 10mg is non formulary, will order equivalent Lisinopril 10mg (hold for now, K+ 5.1)    # Mixed Hyperlipidemia  - c/w home meds Atorvastatin 10mg qD  - Lipid panel in AM     # Hypothyroidism   - Continue with Synthroid   - Check TSH, total T3, T4 level in AM       #Diet: DASH / TLC + Ensure  #GI ppx: pantoprazole  #Activity: IAT  #Code: DNR / DNI (MOLST PRIOR)      Pending:  - Nutrition & Dietician consult  - Speech and swallow eval  - PT consult  - F/u B12, folate, TSH,  total T3, T4, lipid panel 89 y/o F with PMHx of HTN, HLD, hypothyroidism, dementia and anemia presents [facility] for generalized weakness, difficulty ambulating, and persisting nausea x  1 week. Pt has had minimal PO intake during this time. At baseline pt ambulates with walker but today she was unable to stand prompting daughter to send pt to ED.       #Persistent nausea  #Poor oral intake / Failure to thrive / weakness  - Nausea x 1 week  - CTAP 10/11: No CT evidence of acute intra-abdominal pathology. Left adnexal cyst measuring approximately 2.6 cm.   - Nutrition & Dietician consult  - speech and swallow consult  - Gentle hydration  - Zofran PRN  - Monitor and replete electrolytes prn   - K+ 5.1  - Mg 2.6  - Fall precaution   - PT consult    #Dementia  - AAOx1-2 (At baseline)  - CT Head 10/11: No acute intracranial pathology. Stable mild-moderate chronic microvascular ischemic changes.    #Anemia  - Hgb 10.6 (baseline ~10-11)  - Check B12, folate  - Monitor CBC    # Essential Hypertension    - /71 (on admission)   - c/w home meds Nifedipine 60mg qD, Metoprolol 12.5 mg BID  - Benazepril 10mg is non formulary, will order equivalent Lisinopril 10mg (hold for now, K+ 5.1)    # Mixed Hyperlipidemia  - c/w home meds Atorvastatin 10mg qD  - Lipid panel in AM     # Hypothyroidism   - Continue with Synthroid   - Check TSH, total T3, T4 level in AM       #Diet: DASH / TLC + Ensure  #GI ppx: pantoprazole  #Activity: IAT  #Code: DNR / DNI (MOLST PRIOR)      Pending:  - Nutrition & Dietician consult  - Speech and swallow eval  - PT consult  - F/u B12, folate, TSH,  total T3, T4, lipid panel 89 y/o F with PMHx of HTN, HLD, hypothyroidism, dementia and anemia presents [facility] ED [facility] for generalized weakness, difficulty ambulating, and persisting nausea x  1 week. Pt has had minimal PO intake during this time. At baseline pt ambulates with walker but today she was unable to stand prompting daughter to send pt to ED.       #Persistent nausea  #Poor oral intake / Failure to thrive / weakness  - Nausea x 1 week  - CTAP 10/11: No CT evidence of acute intra-abdominal pathology. Left adnexal cyst measuring approximately 2.6 cm.   - Nutrition & Dietician consult  - speech and swallow consult  - Gentle hydration  - Zofran PRN  - Monitor and replete electrolytes prn   - K+ 5.1  - Mg 2.6  - Fall precaution   - PT consult    #Dementia  - AAOx1-2 (At baseline)  - CT Head 10/11: No acute intracranial pathology. Stable mild-moderate chronic microvascular ischemic changes.    #Anemia  - Hgb 10.6 (baseline ~10-11)  - Check B12, folate, iron, TIBC, ferritin  - Monitor CBC    # Essential Hypertension    - /71 (on admission)   - c/w home meds Nifedipine 60mg qD, Metoprolol 12.5 mg BID  - Benazepril 10mg is non formulary, will order equivalent Lisinopril 10mg (hold for now, K+ 5.1)    # Mixed Hyperlipidemia  - c/w home meds Atorvastatin 10mg qD  - Lipid panel in AM     # Hypothyroidism   - Continue with Synthroid   - Check TSH, total T3, T4 level in AM       #Diet: DASH / TLC + Ensure  #GI ppx: pantoprazole  #Activity: IAT  #Code: DNR / DNI (MOLST PRIOR)      Pending:  - Nutrition & Dietician consult  - Speech and swallow eval  - PT consult  - F/u B12, folate, iron, TIBC, ferritin  - F/u TSH,  total T3, T4, lipid panel 89 y/o F with PMHx of HTN, HLD, hypothyroidism, dementia and anemia [facility] for generalized weakness, difficulty ambulating, and persisting nausea x  1 week. Pt has had minimal PO intake during this time. At baseline pt ambulates with walker but today she was unable to stand prompting daughter to send pt to ED.       #Persistent nausea  #Poor oral intake / Failure to thrive / weakness  - Nausea x 1 week  - CTAP 10/11: No CT evidence of acute intra-abdominal pathology. Left adnexal cyst measuring approximately 2.6 cm.   - Nutrition & Dietician consult  - speech and swallow consult  - Gentle hydration  - Zofran PRN  - Monitor and replete electrolytes prn   - K+ 5.1  - Mg 2.6  - Fall precaution   - PT consult    #Dementia  - AAOx1-2 (At baseline)  - CT Head 10/11: No acute intracranial pathology. Stable mild-moderate chronic microvascular ischemic changes.    #COURT likely 2/2 poor po intake  -Baseline creatinine: 1.1 - 1.2 (2022)   - creatinine (on admission): 1.6  -Trend BMP/BUN/Cr  -Avoid nephrotoxic agents. Hold ACE-I in setting of COURT  -Consider Nephro consult if kidney function does not improve. Avoid volume depletion, Dose medications as per eGFR.    #Anemia  - Hgb 10.6 (baseline ~10-11)  - Check B12, folate, iron, TIBC, ferritin  - Monitor CBC    # Essential Hypertension    - /71 (on admission)   - c/w home meds Nifedipine 60mg qD, Metoprolol 12.5 mg BID  - Benazepril 10mg is non formulary, will order equivalent Lisinopril 10mg (hold for now, K+ 5.1)    # Mixed Hyperlipidemia  - c/w home meds Atorvastatin 10mg qD  - Lipid panel in AM     # Hypothyroidism   - Continue with Synthroid   - Check TSH, total T3, T4 level in AM       #Diet: DASH / TLC + Ensure  #GI ppx: pantoprazole  #Activity: IAT  #Code: DNR / DNI (MOLST PRIOR)      Pending:  - Nutrition & Dietician consult  - Speech and swallow eval  - PT consult  - F/u B12, folate, iron, TIBC, ferritin  - F/u TSH,  total T3, T4, lipid panel

## 2024-10-11 NOTE — ED PROVIDER NOTE - PHYSICAL EXAMINATION
VITAL SIGNS: I have reviewed nursing notes and confirm.  CONSTITUTIONAL: 89 yo F laying on stretcher; in no acute distress.  SKIN: Skin exam is warm and dry, no acute rash.  HEAD: Normocephalic; atraumatic.  EYES: PERRL, EOM intact; conjunctiva and sclera clear.  ENT: No nasal discharge; airway clear. Dry MM.   CARD: S1, S2 normal; no murmurs, gallops, or rubs. Regular rate and rhythm.  RESP: No wheezes, rales or rhonchi.   ABD: Normal bowel sounds; soft; non-distended; (+) diffuse TTP; No rebound or guarding. No CVA tenderness.  EXT: Normal ROM. No clubbing, cyanosis or edema.  NEURO: Alert, oriented x 2 (person/place). Grossly unremarkable. No focal deficits.

## 2024-10-11 NOTE — H&P ADULT - HISTORY OF PRESENT ILLNESS
89 y/o F with PMHx of HTN, HLD, hypothyroidism, dementia and anemia [facility] ED [facility] for generalized weakness, difficulty ambulating, and persisting nausea x  1 week. Pt has had minimal PO intake during this time. At baseline pt ambulates with walker but today she was unable to stand prompting daughter to send pt to ED. Pt currently denies complaints but is poor historian.      Vitals    T(C): 36.6 (10-11-24 @ 19:30), Max: 36.8 (10-11-24 @ 17:29)  HR: 68 (10-11-24 @ 20:21) (68 - 80)  BP: 153/73 (10-11-24 @ 20:21) (110/68 - 153/73)  RR: 18 (10-11-24 @ 20:21) (16 - 20)  SpO2: 96% (10-11-24 @ 20:21) (95% - 96%)      10-11-24 @ 07:01  -  10-11-24 @ 21:42  --------------------------------------------------------  IN: 1000 mL / OUT: 750 mL / NET: 250 mL      Labs  WBC 6.17  Hgb 10.6  Na 146  K 5.1  Mg 2.6  Cr 1.6  BUN 40  Lactate 0.9    Imaging  CTAP: No CT evidence of acute intra-abdominal pathology. Left adnexal cyst measuring approximately 2.6 cm.     CT Head: No acute intracranial pathology. Stable mild-moderate chronic microvascular ischemic changes.    EKG: Normal sinus rhythm with 1st degree A-V block    s/p 1L LR Bolus, Zofran 4mg IV    Admitted to medicine for poor po intake, failure to thrive and weakness.     89 y/o F with PMHx of HTN, HLD, hypothyroidism, dementia and anemia presents [facility] ED [facility] for generalized weakness, difficulty ambulating, and persisting nausea x  1 week. Pt has had minimal PO intake during this time. At baseline pt ambulates with walker but today she was unable to stand prompting daughter to send pt to ED. Pt currently denies complaints but is poor historian.      Vitals    T(C): 36.6 (10-11-24 @ 19:30), Max: 36.8 (10-11-24 @ 17:29)  HR: 68 (10-11-24 @ 20:21) (68 - 80)  BP: 153/73 (10-11-24 @ 20:21) (110/68 - 153/73)  RR: 18 (10-11-24 @ 20:21) (16 - 20)  SpO2: 96% (10-11-24 @ 20:21) (95% - 96%)      10-11-24 @ 07:01  -  10-11-24 @ 21:42  --------------------------------------------------------  IN: 1000 mL / OUT: 750 mL / NET: 250 mL      Labs  WBC 6.17  Hgb 10.6  Na 146  K 5.1  Mg 2.6  Cr 1.6  BUN 40  Lactate 0.9    Imaging  CTAP: No CT evidence of acute intra-abdominal pathology. Left adnexal cyst measuring approximately 2.6 cm.     CT Head: No acute intracranial pathology. Stable mild-moderate chronic microvascular ischemic changes.    EKG: Normal sinus rhythm with 1st degree A-V block    s/p 1L LR Bolus, Zofran 4mg IV    Admitted to medicine for persistent nausea, poor po intake, failure to thrive and weakness.   89 y/o F with PMHx of HTN, HLD, hypothyroidism, dementia and anemia presents to the ED [facility] for generalized weakness, difficulty ambulating, and persisting nausea x  1 week. Pt has had minimal PO intake during this time. At baseline pt ambulates with walker but today she was unable to stand prompting daughter to send pt to ED. Pt currently denies complaints but is poor historian.      Vitals  T(C): 36.6 (10-11-24 @ 19:30), Max: 36.8 (10-11-24 @ 17:29)  HR: 68 (10-11-24 @ 20:21) (68 - 80)  BP: 153/73 (10-11-24 @ 20:21) (110/68 - 153/73)  RR: 18 (10-11-24 @ 20:21) (16 - 20)  SpO2: 96% (10-11-24 @ 20:21) (95% - 96%)      10-11-24 @ 07:01  -  10-11-24 @ 21:42  --------------------------------------------------------  IN: 1000 mL / OUT: 750 mL / NET: 250 mL      Labs  WBC 6.17  Hgb 10.6  Na 146  K 5.1  Mg 2.6  Cr 1.6  BUN 40  Lactate 0.9    Imaging  CTAP: No CT evidence of acute intra-abdominal pathology. Left adnexal cyst measuring approximately 2.6 cm.     CT Head: No acute intracranial pathology. Stable mild-moderate chronic microvascular ischemic changes.    EKG: Normal sinus rhythm with 1st degree A-V block    s/p 1L LR Bolus, Zofran 4mg IV    Admitted to medicine for persistent nausea, poor po intake, failure to thrive and weakness.

## 2024-10-11 NOTE — H&P ADULT - NSHPLABSRESULTS_GEN_ALL_CORE
LABS:                        10.6   6.17  )-----------( 151      ( 11 Oct 2024 16:03 )             33.5     10-11    146  |  111[H]  |  40[H]  ----------------------------<  103[H]  5.1[H]   |  24  |  1.6[H]    Ca    10.2      11 Oct 2024 16:03  Mg     2.6     10-11    TPro  5.8[L]  /  Alb  3.7  /  TBili  0.8  /  DBili  x   /  AST  20  /  ALT  18  /  AlkPhos  82  10-11

## 2024-10-11 NOTE — ED PROVIDER NOTE - CLINICAL SUMMARY MEDICAL DECISION MAKING FREE TEXT BOX
89 y/o F with PMHx of HTN, HLD, hypothyroidism, dementia and anemia presents to the ED from [facility] for generalized weakness, difficulty ambulating, and persisting nausea x  1 week. Pt has had minimal PO intake during this time. At baseline pt ambulates with walker but today she was unable to stand prompting daughter to send pt to ED. Pt currently denies complaints but is poor historian.  CTAP: No CT evidence of acute intra-abdominal pathology. Left adnexal cyst measuring approximately 2.6 cm.  CT Head: No acute intracranial pathology. Stable mild-moderate chronic microvascular ischemic changes.  EKG: Normal sinus rhythm with 1st degree A-V block.  Given 1L LR Bolus, Zofran 4mg IV.  Admitted to medicine for persistent nausea, poor po intake, failure to thrive and weakness.

## 2024-10-11 NOTE — ED ADULT NURSE NOTE - NSFALLHARMRISKINTERV_ED_ALL_ED
Assistance OOB with selected safe patient handling equipment if applicable/Assistance with ambulation/Communicate risk of Fall with Harm to all staff, patient, and family/Monitor gait and stability/Monitor for mental status changes and reorient to person, place, and time, as needed/Provide visual cue: red socks, yellow wristband, yellow gown, etc/Reinforce activity limits and safety measures with patient and family/Toileting schedule using arm’s reach rule for commode and bathroom/Use of alarms - bed, stretcher, chair and/or video monitoring/Bed in lowest position, wheels locked, appropriate side rails in place/Call bell, personal items and telephone in reach/Instruct patient to call for assistance before getting out of bed/chair/stretcher/Non-slip footwear applied when patient is off stretcher/[place] to call system/Physically safe environment - no spills, clutter or unnecessary equipment/Purposeful Proactive Rounding/Room/bathroom lighting operational, light cord in reach

## 2024-10-12 LAB
ANION GAP SERPL CALC-SCNC: 6 MMOL/L — LOW (ref 7–14)
BLD GP AB SCN SERPL QL: SIGNIFICANT CHANGE UP
BUN SERPL-MCNC: 36 MG/DL — HIGH (ref 10–20)
CALCIUM SERPL-MCNC: 9.6 MG/DL — SIGNIFICANT CHANGE UP (ref 8.4–10.4)
CHLORIDE SERPL-SCNC: 114 MMOL/L — HIGH (ref 98–110)
CO2 SERPL-SCNC: 24 MMOL/L — SIGNIFICANT CHANGE UP (ref 17–32)
CREAT SERPL-MCNC: 1.4 MG/DL — SIGNIFICANT CHANGE UP (ref 0.7–1.5)
EGFR: 36 ML/MIN/1.73M2 — LOW
GLUCOSE SERPL-MCNC: 156 MG/DL — HIGH (ref 70–99)
HCT VFR BLD CALC: 29.6 % — LOW (ref 37–47)
HCT VFR BLD CALC: 29.7 % — LOW (ref 37–47)
HGB BLD-MCNC: 9.3 G/DL — LOW (ref 12–16)
HGB BLD-MCNC: 9.4 G/DL — LOW (ref 12–16)
MCHC RBC-ENTMCNC: 31.3 G/DL — LOW (ref 32–37)
MCHC RBC-ENTMCNC: 31.5 PG — HIGH (ref 27–31)
MCHC RBC-ENTMCNC: 31.8 G/DL — LOW (ref 32–37)
MCHC RBC-ENTMCNC: 31.8 PG — HIGH (ref 27–31)
MCV RBC AUTO: 100 FL — HIGH (ref 81–99)
MCV RBC AUTO: 100.7 FL — HIGH (ref 81–99)
NRBC # BLD: 0 /100 WBCS — SIGNIFICANT CHANGE UP (ref 0–0)
NRBC # BLD: 0 /100 WBCS — SIGNIFICANT CHANGE UP (ref 0–0)
PLATELET # BLD AUTO: 139 K/UL — SIGNIFICANT CHANGE UP (ref 130–400)
PLATELET # BLD AUTO: 156 K/UL — SIGNIFICANT CHANGE UP (ref 130–400)
PMV BLD: 12.1 FL — HIGH (ref 7.4–10.4)
PMV BLD: 12.5 FL — HIGH (ref 7.4–10.4)
POTASSIUM SERPL-MCNC: 4.8 MMOL/L — SIGNIFICANT CHANGE UP (ref 3.5–5)
POTASSIUM SERPL-SCNC: 4.8 MMOL/L — SIGNIFICANT CHANGE UP (ref 3.5–5)
RBC # BLD: 2.95 M/UL — LOW (ref 4.2–5.4)
RBC # BLD: 2.96 M/UL — LOW (ref 4.2–5.4)
RBC # FLD: 14.8 % — HIGH (ref 11.5–14.5)
RBC # FLD: 14.8 % — HIGH (ref 11.5–14.5)
SODIUM SERPL-SCNC: 144 MMOL/L — SIGNIFICANT CHANGE UP (ref 135–146)
WBC # BLD: 6.55 K/UL — SIGNIFICANT CHANGE UP (ref 4.8–10.8)
WBC # BLD: 6.87 K/UL — SIGNIFICANT CHANGE UP (ref 4.8–10.8)
WBC # FLD AUTO: 6.55 K/UL — SIGNIFICANT CHANGE UP (ref 4.8–10.8)
WBC # FLD AUTO: 6.87 K/UL — SIGNIFICANT CHANGE UP (ref 4.8–10.8)

## 2024-10-12 PROCEDURE — 99233 SBSQ HOSP IP/OBS HIGH 50: CPT

## 2024-10-12 RX ORDER — ONDANSETRON HCL/PF 4 MG/2 ML
4 VIAL (ML) INJECTION EVERY 6 HOURS
Refills: 0 | Status: DISCONTINUED | OUTPATIENT
Start: 2024-10-12 | End: 2024-10-13

## 2024-10-12 RX ORDER — FLUCONAZOLE 200 MG
100 TABLET ORAL DAILY
Refills: 0 | Status: DISCONTINUED | OUTPATIENT
Start: 2024-10-13 | End: 2024-10-13

## 2024-10-12 RX ORDER — CYANOCOBALAMIN (VITAMIN B-12) 1000MCG/ML
1000 VIAL (ML) INJECTION DAILY
Refills: 0 | Status: DISCONTINUED | OUTPATIENT
Start: 2024-10-12 | End: 2024-10-13

## 2024-10-12 RX ORDER — PANTOPRAZOLE SODIUM 40 MG/1
40 TABLET, DELAYED RELEASE ORAL EVERY 12 HOURS
Refills: 0 | Status: DISCONTINUED | OUTPATIENT
Start: 2024-10-12 | End: 2024-10-13

## 2024-10-12 RX ORDER — ENOXAPARIN SODIUM 150 MG/ML
30 INJECTION SUBCUTANEOUS EVERY 24 HOURS
Refills: 0 | Status: DISCONTINUED | OUTPATIENT
Start: 2024-10-12 | End: 2024-10-12

## 2024-10-12 RX ORDER — FOLIC ACID 1 MG/1
1 TABLET ORAL DAILY
Refills: 0 | Status: DISCONTINUED | OUTPATIENT
Start: 2024-10-12 | End: 2024-10-13

## 2024-10-12 RX ORDER — FLUCONAZOLE 200 MG
200 TABLET ORAL ONCE
Refills: 0 | Status: COMPLETED | OUTPATIENT
Start: 2024-10-12 | End: 2024-10-12

## 2024-10-12 RX ORDER — DRONABINOL 5 MG/1
2.5 CAPSULE ORAL DAILY
Refills: 0 | Status: DISCONTINUED | OUTPATIENT
Start: 2024-10-12 | End: 2024-10-13

## 2024-10-12 RX ORDER — PANTOPRAZOLE SODIUM 40 MG/1
40 TABLET, DELAYED RELEASE ORAL
Refills: 0 | Status: DISCONTINUED | OUTPATIENT
Start: 2024-10-12 | End: 2024-10-12

## 2024-10-12 RX ORDER — METOPROLOL TARTRATE 50 MG
12.5 TABLET ORAL
Refills: 0 | Status: DISCONTINUED | OUTPATIENT
Start: 2024-10-12 | End: 2024-10-13

## 2024-10-12 RX ORDER — ATORVASTATIN CALCIUM 10 MG/1
10 TABLET, FILM COATED ORAL AT BEDTIME
Refills: 0 | Status: DISCONTINUED | OUTPATIENT
Start: 2024-10-12 | End: 2024-10-13

## 2024-10-12 RX ADMIN — Medication 12.5 MILLIGRAM(S): at 17:48

## 2024-10-12 RX ADMIN — Medication 200 MILLIGRAM(S): at 11:51

## 2024-10-12 RX ADMIN — PANTOPRAZOLE SODIUM 40 MILLIGRAM(S): 40 TABLET, DELAYED RELEASE ORAL at 18:27

## 2024-10-12 RX ADMIN — DRONABINOL 2.5 MILLIGRAM(S): 5 CAPSULE ORAL at 11:33

## 2024-10-12 RX ADMIN — ATORVASTATIN CALCIUM 10 MILLIGRAM(S): 10 TABLET, FILM COATED ORAL at 22:03

## 2024-10-12 RX ADMIN — Medication 75 MILLILITER(S): at 01:00

## 2024-10-12 RX ADMIN — Medication 12.5 MILLIGRAM(S): at 06:09

## 2024-10-12 RX ADMIN — PANTOPRAZOLE SODIUM 40 MILLIGRAM(S): 40 TABLET, DELAYED RELEASE ORAL at 06:10

## 2024-10-12 RX ADMIN — Medication 75 MICROGRAM(S): at 06:09

## 2024-10-12 RX ADMIN — Medication 60 MILLIGRAM(S): at 06:09

## 2024-10-12 RX ADMIN — PANTOPRAZOLE SODIUM 40 MILLIGRAM(S): 40 TABLET, DELAYED RELEASE ORAL at 14:35

## 2024-10-12 NOTE — PHYSICAL THERAPY INITIAL EVALUATION ADULT - NSACTIVITYREC_GEN_A_PT
Patient requires assistance with functional mobility. PT recommends D/C to rehabilitation facility when medically appropriate.  Refer to evaluation/treatment for details.

## 2024-10-12 NOTE — SWALLOW BEDSIDE ASSESSMENT ADULT - COMMENTS
Pt frequently states food doesn't taste good to her daughter. Pt states " I'm gonna throw up, I'm gonna throw up" after minimal intake. Pt on waiting list to see GI as per daughter.

## 2024-10-12 NOTE — SWALLOW BEDSIDE ASSESSMENT ADULT - SWALLOW EVAL: DIAGNOSIS
toleration of thin liquids via straw sip and minced and moist textures observed, Pt with min acceptance of intake,. Pt repeatedly stated she  felt like she was going to throw up, although she did not. SLP completed oral care as Pt had significant lingual film.

## 2024-10-12 NOTE — PATIENT PROFILE ADULT - FUNCTIONAL ASSESSMENT - BASIC MOBILITY 6.
2-calculated by average/Not able to assess (calculate score using [facility] averaging method)

## 2024-10-12 NOTE — SWALLOW BEDSIDE ASSESSMENT ADULT - SLP PERTINENT HISTORY OF CURRENT PROBLEM
89 y/o F with PMHx of HTN, HLD, hypothyroidism, dementia and anemia presents [facility] ED [facility] for generalized weakness, difficulty ambulating, and persisting nausea x  1 week. Pt has had minimal PO intake during this time. At baseline pt ambulates with walker but today she was unable to stand prompting daughter to send pt to ED.CT abdomen / pelvis :LOWER CHEST: Bibasilar subsegmental atelectasis and reticulations. Small hiatal hernia. CTH : Stable mild-moderate chronic microvascular ischemic changes.

## 2024-10-12 NOTE — PHYSICAL THERAPY INITIAL EVALUATION ADULT - ASSISTIVE DEVICE, REHAB EVAL
Mom will continue to  breastfeed frequently & on cue at least 8+ times/24 hrs.  Will monitor for signs of deep latch & adequate fdg; I&O.  Will have baby's weight checked at ped's office in the next couple of days after d/c from hospital as recommended. Discussed available resources in Breastfeeding Guide. Instructed to call for any questions/needs. Verbalized understanding.              bed rails

## 2024-10-12 NOTE — PHYSICAL THERAPY INITIAL EVALUATION ADULT - LEVEL OF INDEPENDENCE: BED TO CHAIR, REHAB EVAL
Unsafe at this time - use faby lift as appropriate/dependent (less than 25% patients effort)/unable to perform

## 2024-10-12 NOTE — PATIENT PROFILE ADULT - CAREGIVER RELATION TO PATIENT
At this time AVS was provided and reviewed with pt and her son. Both verbalized understanding of instructions, medication regimen and follow ups     Piv removed   No tele in place     Pt to be assisted down to waiting car via wheelchair   
daughter

## 2024-10-12 NOTE — PATIENT PROFILE ADULT - HOW PATIENT ADDRESSED, PROFILE
[facility] Quinolones Counseling:  I discussed with the patient the risks of fluoroquinolones including but not limited to GI upset, allergic reaction, drug rash, diarrhea, dizziness, photosensitivity, yeast infections, liver function test abnormalities, tendonitis/tendon rupture.

## 2024-10-12 NOTE — PATIENT PROFILE ADULT - FALL HARM RISK - HARM RISK INTERVENTIONS

## 2024-10-12 NOTE — PATIENT PROFILE ADULT - FUNCTIONAL ASSESSMENT - BASIC MOBILITY SCORE.
Received a call from Marib[facility]. Tanner is requesting a lower dose for his Fentanyl patch as the 100 mcg is too much. He would prefer to lower to 75 mcg. Will contact Dr. Jaquez.   
12

## 2024-10-12 NOTE — PROGRESS NOTE ADULT - SUBJECTIVE AND OBJECTIVE BOX
SUBJECTIVE:    Patient is a 89y old Female who presents with a chief complaint of Persistent nausea, poor po intake, weakness (11 Oct 2024 21:30)      Today is hospital day 1d.     Overnight Events:     still feeling nauseas and epigastric pain    PAST MEDICAL & SURGICAL HISTORY  HTN (hypertension)    Hypothyroid    Hyperlipidemia    Dementia    No significant past surgical history          ALLERGIES:  No Known Allergies    MEDICATIONS:  STANDING MEDICATIONS  atorvastatin 10 milliGRAM(s) Oral at bedtime  dronabinol 2.5 milliGRAM(s) Oral daily  enoxaparin Injectable 30 milliGRAM(s) SubCutaneous every 24 hours  levothyroxine 75 MICROGram(s) Oral daily  metoprolol tartrate 12.5 milliGRAM(s) Oral two times a day  NIFEdipine XL 60 milliGRAM(s) Oral daily  pantoprazole    Tablet 40 milliGRAM(s) Oral before breakfast    PRN MEDICATIONS  ondansetron    Tablet 4 milliGRAM(s) Oral every 6 hours PRN  ondansetron   Disintegrating Tablet 4 milliGRAM(s) Oral every 6 hours PRN    VITALS:   ICU Vital Signs Last 24 Hrs  T(C): 36.3 (12 Oct 2024 05:40), Max: 36.8 (11 Oct 2024 17:29)  T(F): 97.3 (12 Oct 2024 05:40), Max: 98.3 (11 Oct 2024 23:00)  HR: 62 (12 Oct 2024 06:02) (62 - 80)  BP: 146/67 (12 Oct 2024 06:02) (110/68 - 153/73)  BP(mean): 94 (12 Oct 2024 06:02) (76 - 94)  RR: 18 (12 Oct 2024 05:40) (16 - 20)  SpO2: 97% (11 Oct 2024 23:00) (95% - 97%)    O2 Parameters below as of 11 Oct 2024 23:00  Patient On (Oxygen Delivery Method): room air            LABS:                        10.6   6.17  )-----------( 151      ( 11 Oct 2024 16:03 )             33.5     10-11    146  |  111[H]  |  40[H]  ----------------------------<  103[H]  5.1[H]   |  24  |  1.6[H]    Ca    10.2      11 Oct 2024 16:03  Mg     2.6     10-11    TPro  5.8[L]  /  Alb  3.7  /  TBili  0.8  /  DBili  x   /  AST  20  /  ALT  18  /  AlkPhos  82  10-11       Urinalysis Basic - ( 11 Oct 2024 16:03 )    Color: x / Appearance: x / SG: x / pH: x  Gluc: 103 mg/dL / Ketone: x  / Bili: x / Urobili: x   Blood: x / Protein: x / Nitrite: x   Leuk Esterase: x / RBC: x / WBC x   Sq Epi: x / Non Sq Epi: x / Bacteria: x        Lactate, Blood: 0.9 mmol/L (10-11-24 @ 16:03)      Urinalysis with Rflx Culture (collected 11 Oct 2024 15:02)          Cardiology:            RADIOLOGY:  CT Head No Cont:   ACC: [id number] EXAM:  CT BRAIN   ORDERED BY: PHILLIP LEHMAN     PROCEDURE DATE:  10/11/2024          INTERPRETATION:  CLINICAL INDICATION: Altered mental status.    TECHNIQUE: CT of the head was performed without the administration of   intravenous contrast.    COMPARISON: CT head dated 9/30/2024.    FINDINGS:    There is stable prominence of the sulci, sylvian fissures, and ventricles   likely reflecting mild diffuse parenchymal volume loss.    There are stable scattered patchy low attenuations in bilateral   periventricular and subcortical cerebral white matter consistent with   mild-moderate chronic microvascular ischemic changes.    There is no evidence of acute territorial infarct or intracranial   hemorrhage. There is no space-occupying lesion or midline shift.    There is no evidence of hydrocephalus. There are no extra-axial fluid   collections.    The visualized intraorbital contents are normal. Small retention cyst in   the left maxillary sinus The mastoid air cells are aerated. The   visualized soft tissues and osseous structures appear normal.      IMPRESSION:    No acute intracranial pathology.    Stable mild-moderate chronic microvascular ischemic changes.    --- End of Report ---            LORI SERRA MD; Attending Radiologist  This document has been electronically signed. Oct 11 2024  6:18PM (10-11-24 @ 17:43)    CT Abdomen and Pelvis w/ IV Cont:   ACC: [id number] EXAM:  CT ABDOMEN AND PELVIS IC   ORDERED BY: PHILLIP LEHMAN     PROCEDURE DATE:  10/11/2024          INTERPRETATION:  CLINICAL INFORMATION: Abdominal pain    COMPARISON: CT ABDOMEN/PELVIS 9/30/2024.    CONTRAST/COMPLICATIONS:  IV Contrast: Omnipaque 350  100 cc administered   0 cc discarded  Oral Contrast: NONE  Complications: None reported at time of study completion    PROCEDURE:  CT of the Abdomen and Pelvis was performed.  Sagittal and coronal reformats were performed.    FINDINGS:  LOWER CHEST: Bibasilar subsegmental atelectasis and reticulations. Small   hiatal hernia    LIVER: Within normal limits.  BILE DUCTS: Normal caliber.  GALLBLADDER: Cholelithiasis.  SPLEEN: Within normal limits.  PANCREAS: Within normal limits.  ADRENALS: Within normal limits.  KIDNEYS/URETERS: No renal stones or hydronephrosis. Subcentimeter   hypodensities, too small to further characterize    BLADDER: Mercer catheter.  REPRODUCTIVE ORGANS: Uterus within normal limits. Stable left adnexal   cyst (series 6, image 361)    BOWEL: No bowel obstruction.  PERITONEUM/RETROPERITONEUM: Within normal limits.  VESSELS: Atherosclerotic changes.  LYMPH NODES: No lymphadenopathy.  ABDOMINAL WALL: Small fat-containing umbilical hernia  BONES: Osteopenia.Multilevel degenerative changes    IMPRESSION:  No CT evidence of acute intra-abdominal pathology.    Left adnexal cyst measuring approximately 2.6 cm. Consider   nonemergent/outpatient pelvic ultrasound for follow-up    --- End of Report ---            LESLIE GLOVER MD; Attending Radiologist  This document has been electronically signed. Oct 11 2024  6:24PM (10-11-24 @ 17:49)          Lines:  Central line:              Date placed:             Indication:   Intravenous Access:   NG tube:   Mercer Catheter:       Diagnositic orders     dronabinol (10-12-24 @ 11:03) [Active]  Diet, Minced and Moist (10-12-24 @ 11:03) [Active]  ondansetron   Disintegrating Tablet (10-12-24 @ 11:03) [Active]  Discontinue Urethral Catheter (10-12-24 @ 10:38) [Active]  Activity - Increase As Tolerated (10-12-24 @ 10:03) [Active]  enoxaparin Injectable (10-12-24 @ 05:23) [Active]  ondansetron    Tablet (10-12-24 @ 01:00) [Active]  Consult- Case Management (10-12-24 @ 00:37) [Active]  Consult- Registered Dietitian (10-12-24 @ 00:37) [Active]  Provider to RN (10-12-24 @ 00:18) [Active]  pantoprazole    Tablet (10-12-24 @ 00:06) [Active]  metoprolol tartrate (10-12-24 @ 00:06) [Active]  levothyroxine (10-12-24 @ 00:06) [Active]  NIFEdipine XL (10-12-24 @ 00:06) [Active]  atorvastatin (10-12-24 @ 00:06) [Active]  Transfer In - Clerical Use Only (10-11-24 @ 21:52) [Active]  Admit from ED (10-11-24 @ 19:18) [Active]  Straight Cath for Residual Urine (10-11-24 @ 15:30) [Active]

## 2024-10-12 NOTE — PHYSICAL THERAPY INITIAL EVALUATION ADULT - RANGE OF MOTION EXAMINATION, REHAB EVAL
Except B shdrs to 90/bilateral upper extremity ROM was WFL (within functional limits)/bilateral lower extremity ROM was WFL (within functional limits)

## 2024-10-12 NOTE — PHYSICAL THERAPY INITIAL EVALUATION ADULT - IMPAIRMENTS FOUND, PT EVAL
Ms Torres was very lethargic today/aerobic capacity/endurance/arousal, attention, and cognition/gait, locomotion, and balance/gross motor/posture/ROM

## 2024-10-12 NOTE — PHYSICAL THERAPY INITIAL EVALUATION ADULT - PLANNED THERAPY INTERVENTIONS, PT EVAL
balance training/bed mobility training/gait training/motor coordination training/ROM/strengthening/transfer training

## 2024-10-12 NOTE — PROGRESS NOTE ADULT - ASSESSMENT
89 y/o F with PMHx of HTN, HLD, hypothyroidism, dementia and anemia presents to the ED from [facility] for generalized weakness, difficulty ambulating, and persisting nausea x  1 week. Pt has had minimal PO intake during this time. At baseline pt ambulates with walker but today she was unable to stand prompting daughter to send pt to ED.     GENERAL: NAD, lying in bed comfortably  HEENT: Thrush noted  CHEST/LUNG: Clear to auscultation bilaterally; No rales, rhonchi, wheezing, or rubs. Unlabored respirations  HEART: Regular rate and rhythm; No murmurs, rubs, or gallops  ABDOMEN: Bowel sounds present; Soft, Nontender, Nondistended. No hepatomegally  EXTREMITIES: RUE edema  NERVOUS SYSTEM:  Alert & Oriented X1       #Persistent nausea  #thrush   #COURT likely 2/2 poor po intake  #macrocytic anemia   - CTAP 10/11: No CT evidence of acute intra-abdominal pathology. Left adnexal cyst measuring approximately 2.6 cm.   - speech and swallow consult-> minced and moist  - start fluconazole  - start zofran  - start ppi     #Dementia  - AAOx1-2 (At baseline)  - CT Head 10/11: No acute intracranial pathology. Stable mild-moderate chronic microvascular ischemic changes.      - Hgb 10.6 (baseline ~10-11)  - Check B12, folate, iron, TIBC, ferritin  - Monitor CBC    # Essential Hypertension    - /71 (on admission)   - c/w home meds Nifedipine 60mg qD, Metoprolol 12.5 mg BID  - hold ace     # Mixed Hyperlipidemia  - c/w home meds Atorvastatin 10mg qD  - Lipid panel in AM     # Hypothyroidism   - Continue with Synthroid   - Check TSH, total T3, T4 level in AM       #Diet: DASH / TLC + Ensure  #GI ppx: pantoprazole  #Activity: IAT  #Code: DNR / DNI (MOLST PRIOR)      Dispo: Spoke with daughter - she is ok with treating thrush, giving nasuea medication and continuing workup outpatient. CM tasked with communication with hilda to see if patient can go back today or tomorrow.  89 y/o F with PMHx of HTN, HLD, hypothyroidism, dementia and anemia presents to the ED from [facility] for generalized weakness, difficulty ambulating, and persisting nausea x  1 week. Pt has had minimal PO intake during this time. At baseline pt ambulates with walker but today she was unable to stand prompting daughter to send pt to ED.     GENERAL: NAD, lying in bed comfortably  HEENT: Thrush noted  CHEST/LUNG: Clear to auscultation bilaterally; No rales, rhonchi, wheezing, or rubs. Unlabored respirations  HEART: Regular rate and rhythm; No murmurs, rubs, or gallops  ABDOMEN: tender to palpation  EXTREMITIES: RUE edema  NERVOUS SYSTEM:  Alert & Oriented X1     #macrocytic anemia - concern for PUD or GIB  #Persistent nausea  #thrush   #COURT likely 2/2 poor po intake  - CTAP 10/11: No CT evidence of acute intra-abdominal pathology. Left adnexal cyst measuring approximately 2.6 cm.   - speech and swallow consult-> minced and moist  - hgb 10.6 -> 9.3  - Check B12, folate, iron, TIBC, ferritin  - start fluconazole  - start zofran  - start ppi  IV bid     #Dementia  - AAOx1-2 (At baseline)  - CT Head 10/11: No acute intracranial pathology. Stable mild-moderate chronic microvascular ischemic changes.    # Essential Hypertension    - /71 (on admission)   - c/w home meds Nifedipine 60mg qD, Metoprolol 12.5 mg BID  - hold ace     # Mixed Hyperlipidemia  - c/w home meds Atorvastatin 10mg qD    # Hypothyroidism   - Continue with Synthroid      #Diet: DASH / TLC + Ensure  #GI ppx: pantoprazole  #Activity: IAT  #Code: DNR / DNI (MOLST PRIOR)      Dispo: Spoke with daughter - she is ok with treating thrush, giving nasuea medication and continuing workup outpatient. CM tasked with communication with kristy to see if patient can go back today or tomorrow.

## 2024-10-12 NOTE — SWALLOW BEDSIDE ASSESSMENT ADULT - SWALLOW EVAL: PATIENT/FAMILY GOALS STATEMENT
SLP educated Pt and her daughter on importance of oral hygiene ( scrubbing tongue) in reducing aspiration pneumonia and instances of thrush.. They expressed understanding

## 2024-10-12 NOTE — PATIENT PROFILE ADULT - HAVE YOU RECENTLY LOST WEIGHT WITHOUT TRYING?
S/w pt and informed that medication requested has been sent to pharmacy as requested. Pt voiced understanding./Kmw   Unsure (2)

## 2024-10-12 NOTE — SWALLOW BEDSIDE ASSESSMENT ADULT - SWALLOW EVAL: RECOMMENDED FEEDING/EATING TECHNIQUES
daily oral hygiene/crush medication (when feasible)/maintain upright posture during/after eating for 30 mins/oral hygiene

## 2024-10-13 VITALS — SYSTOLIC BLOOD PRESSURE: 96 MMHG | HEART RATE: 67 BPM | DIASTOLIC BLOOD PRESSURE: 43 MMHG

## 2024-10-13 LAB
ALBUMIN SERPL ELPH-MCNC: 3.3 G/DL — LOW (ref 3.5–5.2)
ALP SERPL-CCNC: 79 U/L — SIGNIFICANT CHANGE UP (ref 30–115)
ALT FLD-CCNC: 14 U/L — SIGNIFICANT CHANGE UP (ref 0–41)
ANION GAP SERPL CALC-SCNC: 9 MMOL/L — SIGNIFICANT CHANGE UP (ref 7–14)
AST SERPL-CCNC: 20 U/L — SIGNIFICANT CHANGE UP (ref 0–41)
BASOPHILS # BLD AUTO: 0.01 K/UL — SIGNIFICANT CHANGE UP (ref 0–0.2)
BASOPHILS NFR BLD AUTO: 0.2 % — SIGNIFICANT CHANGE UP (ref 0–1)
BILIRUB SERPL-MCNC: 0.9 MG/DL — SIGNIFICANT CHANGE UP (ref 0.2–1.2)
BUN SERPL-MCNC: 31 MG/DL — HIGH (ref 10–20)
CALCIUM SERPL-MCNC: 9.5 MG/DL — SIGNIFICANT CHANGE UP (ref 8.4–10.5)
CHLORIDE SERPL-SCNC: 110 MMOL/L — SIGNIFICANT CHANGE UP (ref 98–110)
CO2 SERPL-SCNC: 23 MMOL/L — SIGNIFICANT CHANGE UP (ref 17–32)
CREAT SERPL-MCNC: 1.3 MG/DL — SIGNIFICANT CHANGE UP (ref 0.7–1.5)
EGFR: 39 ML/MIN/1.73M2 — LOW
EOSINOPHIL # BLD AUTO: 0.09 K/UL — SIGNIFICANT CHANGE UP (ref 0–0.7)
EOSINOPHIL NFR BLD AUTO: 1.4 % — SIGNIFICANT CHANGE UP (ref 0–8)
FOLATE SERPL-MCNC: >20 NG/ML — SIGNIFICANT CHANGE UP
GLUCOSE SERPL-MCNC: 105 MG/DL — HIGH (ref 70–99)
HCT VFR BLD CALC: 29.2 % — LOW (ref 37–47)
HGB BLD-MCNC: 9.3 G/DL — LOW (ref 12–16)
IMM GRANULOCYTES NFR BLD AUTO: 0.3 % — SIGNIFICANT CHANGE UP (ref 0.1–0.3)
LYMPHOCYTES # BLD AUTO: 2.06 K/UL — SIGNIFICANT CHANGE UP (ref 1.2–3.4)
LYMPHOCYTES # BLD AUTO: 31.3 % — SIGNIFICANT CHANGE UP (ref 20.5–51.1)
MAGNESIUM SERPL-MCNC: 2.3 MG/DL — SIGNIFICANT CHANGE UP (ref 1.8–2.4)
MCHC RBC-ENTMCNC: 31.8 G/DL — LOW (ref 32–37)
MCHC RBC-ENTMCNC: 31.8 PG — HIGH (ref 27–31)
MCV RBC AUTO: 100 FL — HIGH (ref 81–99)
MONOCYTES # BLD AUTO: 0.81 K/UL — HIGH (ref 0.1–0.6)
MONOCYTES NFR BLD AUTO: 12.3 % — HIGH (ref 1.7–9.3)
NEUTROPHILS # BLD AUTO: 3.59 K/UL — SIGNIFICANT CHANGE UP (ref 1.4–6.5)
NEUTROPHILS NFR BLD AUTO: 54.5 % — SIGNIFICANT CHANGE UP (ref 42.2–75.2)
NRBC # BLD: 0 /100 WBCS — SIGNIFICANT CHANGE UP (ref 0–0)
PHOSPHATE SERPL-MCNC: 2.5 MG/DL — SIGNIFICANT CHANGE UP (ref 2.1–4.9)
PLATELET # BLD AUTO: 124 K/UL — LOW (ref 130–400)
PMV BLD: 12.3 FL — HIGH (ref 7.4–10.4)
POTASSIUM SERPL-MCNC: 4.3 MMOL/L — SIGNIFICANT CHANGE UP (ref 3.5–5)
POTASSIUM SERPL-SCNC: 4.3 MMOL/L — SIGNIFICANT CHANGE UP (ref 3.5–5)
PROT SERPL-MCNC: 5.2 G/DL — LOW (ref 6–8)
RBC # BLD: 2.92 M/UL — LOW (ref 4.2–5.4)
RBC # FLD: 14.7 % — HIGH (ref 11.5–14.5)
SODIUM SERPL-SCNC: 142 MMOL/L — SIGNIFICANT CHANGE UP (ref 135–146)
VIT B12 SERPL-MCNC: >2000 PG/ML — HIGH (ref 232–1245)
WBC # BLD: 6.58 K/UL — SIGNIFICANT CHANGE UP (ref 4.8–10.8)
WBC # FLD AUTO: 6.58 K/UL — SIGNIFICANT CHANGE UP (ref 4.8–10.8)

## 2024-10-13 PROCEDURE — 99239 HOSP IP/OBS DSCHRG MGMT >30: CPT

## 2024-10-13 RX ORDER — PANTOPRAZOLE SODIUM 40 MG/1
1 TABLET, DELAYED RELEASE ORAL
Qty: 0 | Refills: 0 | DISCHARGE
Start: 2024-10-13

## 2024-10-13 RX ORDER — ONDANSETRON HCL/PF 4 MG/2 ML
1 VIAL (ML) INJECTION
Qty: 0 | Refills: 0 | DISCHARGE
Start: 2024-10-13

## 2024-10-13 RX ORDER — FLUCONAZOLE 200 MG
1 TABLET ORAL
Qty: 0 | Refills: 0 | DISCHARGE
Start: 2024-10-13

## 2024-10-13 RX ORDER — PANTOPRAZOLE SODIUM 40 MG/1
40 TABLET, DELAYED RELEASE ORAL
Refills: 0 | Status: DISCONTINUED | OUTPATIENT
Start: 2024-10-13 | End: 2024-10-13

## 2024-10-13 RX ORDER — DRONABINOL 5 MG/1
1 CAPSULE ORAL
Qty: 0 | Refills: 0 | DISCHARGE
Start: 2024-10-13

## 2024-10-13 RX ADMIN — Medication 75 MICROGRAM(S): at 05:48

## 2024-10-13 RX ADMIN — Medication 60 MILLIGRAM(S): at 05:48

## 2024-10-13 RX ADMIN — DRONABINOL 2.5 MILLIGRAM(S): 5 CAPSULE ORAL at 11:20

## 2024-10-13 RX ADMIN — PANTOPRAZOLE SODIUM 40 MILLIGRAM(S): 40 TABLET, DELAYED RELEASE ORAL at 05:48

## 2024-10-13 RX ADMIN — Medication 12.5 MILLIGRAM(S): at 17:45

## 2024-10-13 RX ADMIN — PANTOPRAZOLE SODIUM 40 MILLIGRAM(S): 40 TABLET, DELAYED RELEASE ORAL at 17:45

## 2024-10-13 RX ADMIN — Medication 100 MILLIGRAM(S): at 11:20

## 2024-10-13 RX ADMIN — Medication 12.5 MILLIGRAM(S): at 05:48

## 2024-10-13 NOTE — CONSULT NOTE ADULT - SUBJECTIVE AND OBJECTIVE BOX
Gastroenterology Consultation:    Patient is a 89y old  Female who presents with a chief complaint of Persistent nausea, poor po intake, weakness (13 Oct 2024 13:17)        Admitted on: 10-11-24      HPI:  89 y/o F with PMHx of HTN, HLD, hypothyroidism, dementia (AAX1), hx of Grade C esophagitis,  chronic macrocytic anemia presents to the ED from [facility] for generalized weakness, difficulty ambulating, and persisting nausea x  1 week. Pt has had minimal PO intake during this time. GI consulted for acute on chronic macrocytic anemia.  Per charts and per primary team no evidence of blood per rectum, swallowing difficulty, abdominal pain, nausea, vomiting currently she has a regular diet ordered and tolerating per primary team.    Prior EGD: : Grade C esophagitis, nonerosive gastritis [HP-], duodenitis [preserved villi]    Prior Colonoscopy:  None documented      PAST MEDICAL & SURGICAL HISTORY:  HTN (hypertension)      Hypothyroid      Hyperlipidemia      Dementia      No significant past surgical history            FAMILY HISTORY:  Patient unable to provide medical history        Social History:  Patient unable to provide medical history    Tobacco:  Alcohol:  Drugs:    Home Medications:  droNABinol 2.5 mg oral capsule: 1 cap(s) orally once a day (13 Oct 2024 13:21)  fluconazole 100 mg oral tablet: 1 tab(s) orally once a day Stop after 6 days (13 Oct 2024 13:21)  LEVOTHYROXINE 0.075MG (75MCG) TABS: 1 tab(s) orally once a day (2022 17:33)  Metoprolol Tartrate 25 mg oral tablet: 0.5 tab(s) orally 2 times a day (2022 10:19)  Nifedical XL 60 mg oral tablet, extended release: 1 tab(s) orally once a day (2022 17:33)  ondansetron 4 mg oral tablet, disintegratin tab(s) orally every 6 hours As needed Nausea and/or Vomiting (13 Oct 2024 13:21)  pantoprazole 40 mg oral delayed release tablet: 1 tab(s) orally 2 times a day (13 Oct 2024 13:21)        MEDICATIONS  (STANDING):  atorvastatin 10 milliGRAM(s) Oral at bedtime  dronabinol 2.5 milliGRAM(s) Oral daily  fluconAZOLE   Tablet 100 milliGRAM(s) Oral daily  levothyroxine 75 MICROGram(s) Oral daily  metoprolol tartrate 12.5 milliGRAM(s) Oral two times a day  NIFEdipine XL 60 milliGRAM(s) Oral daily  pantoprazole    Tablet 40 milliGRAM(s) Oral two times a day    MEDICATIONS  (PRN):  ondansetron   Disintegrating Tablet 4 milliGRAM(s) Oral every 6 hours PRN Nausea and/or Vomiting      Allergies  No Known Allergies      Review of Systems:   Constitutional:  No Fever, No Chills  ENT/Mouth:  No Hearing Changes,  No Difficulty Swallowing  Eyes:  No Eye Pain, No Vision Changes  Cardiovascular:  No Chest Pain, No Palpitations  Respiratory:  No Cough, No Dyspnea  Gastrointestinal:  As described in HPI          Physical Examination:  T(C): 35.9 (10-13-24 @ 11:40), Max: 37.1 (10-12-24 @ 19:43)  HR: 64 (10-13-24 @ 11:40) (60 - 66)  BP: 104/44 (10-13-24 @ 11:40) (104/44 - 136/56)  RR: 18 (10-13-24 @ 11:40) (18 - 18)  SpO2: 97% (10-13-24 @ 04:21) (97% - 98%)      10-11-24 @ 07:01  -  10-12-24 @ 07:00  --------------------------------------------------------  IN: 1000 mL / OUT: 750 mL / NET: 250 mL      GENERAL: AAOx1  HEAD:  Atraumatic, Normocephalic  EYES: conjunctiva and sclera clear  CHEST/LUNG: Clear to auscultation bilaterally;   HEART: Regular rate and rhythm; normal S1, S2  ABDOMEN: Soft, nontender, nondistended; Bowel sounds present  SKIN: Intact, no jaundice    Data:                        9.3    6.58  )-----------( 124      ( 13 Oct 2024 07:40 )             29.2     Hgb Trend:  9.3  10-13-24 @ 07:40  9.4  10-12-24 @ 17:41  9.3  10-12-24 @ 13:47  10.6  10-11-24 @ 16:03        10-13    142  |  110  |  31[H]  ----------------------------<  105[H]  4.3   |  23  |  1.3    Ca    9.5      13 Oct 2024 07:40  Phos  2.5     10-13  Mg     2.3     10-13    TPro  5.2[L]  /  Alb  3.3[L]  /  TBili  0.9  /  DBili  x   /  AST  20  /  ALT  14  /  AlkPhos  79  10-13    Liver panel trend:  TBili 0.9   /   AST 20   /   ALT 14   /   AlkP 79   /   Tptn 5.2   /   Alb 3.3    /   DBili --      10-13  TBili 0.8   /   AST 20   /   ALT 18   /   AlkP 82   /   Tptn 5.8   /   Alb 3.7    /   DBili --      10-11      Urinalysis with Rflx Culture (collected 11 Oct 2024 15:02)      Radiology:       CT Abdomen and Pelvis w/ IV Cont (10.11.24 @ 17:49) >  No CT evidence of acute intra-abdominal pathology.    Left adnexal cyst measuring approximately 2.6 cm. Consider   nonemergent/outpatient pelvic ultrasound for follow-up

## 2024-10-13 NOTE — DISCHARGE NOTE PROVIDER - CARE PROVIDER_API CALL
Nelia [address] [address]  Phone: [phone]  Fax: [phone]  Follow Up Time: 1 week    Mark Thompson  Gastroenterology  [address] [place] [address]  Phone: [phone]  Fax: [phone]  Follow Up Time: 1 week

## 2024-10-13 NOTE — DISCHARGE NOTE PROVIDER - PROVIDER TOKENS
PROVIDER:[TOKEN:[[id number]],FOLLOWUP:[1 week]],PROVIDER:[TOKEN:[[id number]],FOLLOWUP:[1 week]]

## 2024-10-13 NOTE — DISCHARGE NOTE PROVIDER - HOSPITAL COURSE
HPI:  89 y/o F with PMHx of HTN, HLD, hypothyroidism, dementia and anemia presents to the ED from [facility] for generalized weakness, difficulty ambulating, and persisting nausea x  1 week. Pt has had minimal PO intake during this time. At baseline pt ambulates with walker but today she was unable to stand prompting daughter to send pt to ED. Pt currently denies complaints but is poor historian.      Vitals  T(C): 36.6 (10-11-24 @ 19:30), Max: 36.8 (10-11-24 @ 17:29)  HR: 68 (10-11-24 @ 20:21) (68 - 80)  BP: 153/73 (10-11-24 @ 20:21) (110/68 - 153/73)  RR: 18 (10-11-24 @ 20:21) (16 - 20)  SpO2: 96% (10-11-24 @ 20:21) (95% - 96%)      10-11-24 @ 07:01  -  10-11-24 @ 21:42  --------------------------------------------------------  IN: 1000 mL / OUT: 750 mL / NET: 250 mL      Labs  WBC 6.17  Hgb 10.6  Na 146  K 5.1  Mg 2.6  Cr 1.6  BUN 40  Lactate 0.9    Imaging  CTAP: No CT evidence of acute intra-abdominal pathology. Left adnexal cyst measuring approximately 2.6 cm.     CT Head: No acute intracranial pathology. Stable mild-moderate chronic microvascular ischemic changes.    EKG: Normal sinus rhythm with 1st degree A-V block    s/p 1L LR Bolus, Zofran 4mg IV    Admitted to medicine for persistent nausea, poor po intake, failure to thrive and weakness.   (11 Oct 2024 21:30)      89 y/o F with PMHx of HTN, HLD, hypothyroidism, dementia and anemia presents to the ED from [facility] for generalized weakness, difficulty ambulating, and persisting nausea x  1 week. Pt has had minimal PO intake during this time. At baseline pt ambulates with walker but today she was unable to stand prompting daughter to send pt to ED.     GENERAL: NAD, lying in bed comfortably  HEENT: Thrush improving   CHEST/LUNG: Clear to auscultation bilaterally; No rales, rhonchi, wheezing, or rubs. Unlabored respirations  HEART: Regular rate and rhythm; No murmurs, rubs, or gallops  ABDOMEN: nontender  EXTREMITIES: RUE edema  NERVOUS SYSTEM:  Alert & Oriented X1     #macrocytic anemia - concern for PUD or GIB  #Persistent nausea  #thrush   #COURT likely 2/2 poor po intake  - CTAP 10/11: No CT evidence of acute intra-abdominal pathology. Left adnexal cyst measuring approximately 2.6 cm.   - speech and swallow consult-> minced and moist  - hgb 10.6 -> 9.3 stable   - complete fluconazole 100 mg 7 days   - cw zofran  - dc on ppi  po bid   - GI consult- outpatient egd.     #Dementia  - AAOx1-2 (At baseline)  - CT Head 10/11: No acute intracranial pathology. Stable mild-moderate chronic microvascular ischemic changes.    # Essential Hypertension    - /71 (on admission)   - c/w home meds Nifedipine 60mg qD, Metoprolol 12.5 mg BID  - hold ace     # Mixed Hyperlipidemia  - c/w home meds Atorvastatin 10mg qD    # Hypothyroidism   - Continue with Synthroid

## 2024-10-13 NOTE — DISCHARGE NOTE PROVIDER - NSDCCPCAREPLAN_GEN_ALL_CORE_FT
PRINCIPAL DISCHARGE DIAGNOSIS  Diagnosis: Anemia  Assessment and Plan of Treatment: We suspect that your abdominal pain may be due to gastritis. We started you on protonix BID. please make an appointment with outpatient Gi for potential follow up.   You can call [phone] [facility] to schedule an appointment with our GI clinic.      SECONDARY DISCHARGE DIAGNOSES  Diagnosis: Thrush  Assessment and Plan of Treatment: We believe that your nasuea may be due to thrush. We started you on Flucanozole that will complete in 6 days.

## 2024-10-13 NOTE — DISCHARGE NOTE NURSING/CASE MANAGEMENT/SOCIAL WORK - NSDCPEFALRISK_GEN_ALL_CORE
For information on Fall & Injury Prevention, visit: [email or web address] OR  [email or web address] OR  https://www.cdc.gov/steadi/patient.html

## 2024-10-13 NOTE — DISCHARGE NOTE NURSING/CASE MANAGEMENT/SOCIAL WORK - PATIENT PORTAL LINK FT
You can access the FollowMyHealth Patient Portal offered by [facility] by registering at the following website: [email or web address]. By joining [employer]’s FollowMyHealth portal, you will also be able to view your health information using other applications (apps) compatible with our system.

## 2024-10-13 NOTE — CONSULT NOTE ADULT - ASSESSMENT
89 y/o F with PMHx of HTN, HLD, hypothyroidism, dementia (AAX1), hx of Grade C esophagitis,  chronic macrocytic anemia presents to the ED from [facility] for generalized weakness, difficulty ambulating, and persisting nausea x  1 week. Pt has had minimal PO intake during this time. GI consulted for acute on chronic macrocytic anemia.      #Acute on chronic macrocytic anemia with evidence of thrombocytopenia-rule out MDS no evidence of overt GI bleed.  #hx of Grade C esophagitis 2022  - Hemodynamically stable & no active bleeding  - Baseline hemoglobin - 9-11  - Hemoglobin on admission - 10.6>9.3 (), platelets: 124k  - not on AC  - EGD as above    #Rec  - Recommend PPI p.o. twice daily.  Will recommend outpatient evaluation for workup of anemia if within goals of care will recommend hematology evaluation given macrocytic anemia with evidence of thrombocytopenia to rule out MDS and other causes.  - Check iron studies   - Maintain active Type and screen  - Please target Hb  >7  - Please avoid any NSAIDs  - If any unstable bleed, please call GI stat  - Follow up with our [facility] located at [address] [place] [address]. Phone Number: [phone]    - Recall us PRN

## 2024-10-13 NOTE — DISCHARGE NOTE PROVIDER - NSDCMRMEDTOKEN_GEN_ALL_CORE_FT
droNABinol 2.5 mg oral capsule: 1 cap(s) orally once a day  fluconazole 100 mg oral tablet: 1 tab(s) orally once a day Stop after 6 days  LEVOTHYROXINE 0.075MG (75MCG) TABS: 1 tab(s) orally once a day  Metoprolol Tartrate 25 mg oral tablet: 0.5 tab(s) orally 2 times a day  Nifedical XL 60 mg oral tablet, extended release: 1 tab(s) orally once a day  ondansetron 4 mg oral tablet, disintegratin tab(s) orally every 6 hours As needed Nausea and/or Vomiting  pantoprazole 40 mg oral delayed release tablet: 1 tab(s) orally 2 times a day

## 2024-10-19 DIAGNOSIS — Z87.891 PERSONAL HISTORY OF NICOTINE DEPENDENCE: ICD-10-CM

## 2024-10-19 DIAGNOSIS — D53.9 NUTRITIONAL ANEMIA, UNSPECIFIED: ICD-10-CM

## 2024-10-19 DIAGNOSIS — E03.9 HYPOTHYROIDISM, UNSPECIFIED: ICD-10-CM

## 2024-10-19 DIAGNOSIS — Z79.890 HORMONE REPLACEMENT THERAPY: ICD-10-CM

## 2024-10-19 DIAGNOSIS — F03.90 UNSPECIFIED DEMENTIA, UNSPECIFIED SEVERITY, WITHOUT BEHAVIORAL DISTURBANCE, PSYCHOTIC DISTURBANCE, MOOD DISTURBANCE, AND ANXIETY: ICD-10-CM

## 2024-10-19 DIAGNOSIS — R53.1 WEAKNESS: ICD-10-CM

## 2024-10-19 DIAGNOSIS — K42.9 UMBILICAL HERNIA WITHOUT OBSTRUCTION OR GANGRENE: ICD-10-CM

## 2024-10-19 DIAGNOSIS — K29.70 GASTRITIS, UNSPECIFIED, WITHOUT BLEEDING: ICD-10-CM

## 2024-10-19 DIAGNOSIS — B37.0 CANDIDAL STOMATITIS: ICD-10-CM

## 2024-10-19 DIAGNOSIS — R62.7 ADULT FAILURE TO THRIVE: ICD-10-CM

## 2024-10-19 DIAGNOSIS — I10 ESSENTIAL (PRIMARY) HYPERTENSION: ICD-10-CM

## 2024-10-19 DIAGNOSIS — I44.0 ATRIOVENTRICULAR BLOCK, FIRST DEGREE: ICD-10-CM

## 2024-10-19 DIAGNOSIS — D69.6 THROMBOCYTOPENIA, UNSPECIFIED: ICD-10-CM

## 2024-10-19 DIAGNOSIS — N17.9 ACUTE KIDNEY FAILURE, UNSPECIFIED: ICD-10-CM

## 2024-10-19 DIAGNOSIS — N83.202 UNSPECIFIED OVARIAN CYST, LEFT SIDE: ICD-10-CM

## 2024-10-19 DIAGNOSIS — E78.2 MIXED HYPERLIPIDEMIA: ICD-10-CM

## 2024-10-23 NOTE — CHART NOTE - NSCHARTNOTEFT_GEN_A_CORE
"[id number] / Left message 10/18 - JL / Reach out to Khalida to give appt info. Sent to [facility] chat 10/21 - JL / Appointment made - COLBY    SPECIALTY: gastroenterology  DATE: January 8, 2025 @ 9:00 AM  LOCATION: [address] [place] [address]   CLINICIAN: JANEY Ferrari"

## 2025-01-08 ENCOUNTER — APPOINTMENT (OUTPATIENT)
Dept: GASTROENTEROLOGY | Facility: CLINIC | Age: 89
End: 2025-01-08

## 2025-05-10 NOTE — PATIENT PROFILE ADULT - DOMESTIC TRAVEL HIGH RISK QUESTION
Pt presenting with severe nonbloody diarrea, found to be (+) for c. diff on stool testing. Meets criteria for severe c. diff (WBC >15k). CTAP showing no acute abnormality in the abdomen and pelvis including megacolon, pt hemodynamically stable, no evidence of fulminant c. diff. Was admitted mid-april and was on ctx/flagyl during episode of ischemic colitis.  - hold home protonix  - C/w vancomycin  mg q6 x 10 days  - encourage PO intake  - tylenol and zofran PRN for pain/nausea  - pt will reach out to GI to move up her colonoscopy which was initially scheduled for June   - Stool count  - baseline EKG for qtc No